# Patient Record
Sex: FEMALE | Race: WHITE | NOT HISPANIC OR LATINO
[De-identification: names, ages, dates, MRNs, and addresses within clinical notes are randomized per-mention and may not be internally consistent; named-entity substitution may affect disease eponyms.]

---

## 2017-01-07 ENCOUNTER — RX RENEWAL (OUTPATIENT)
Age: 57
End: 2017-01-07

## 2017-02-05 ENCOUNTER — RX RENEWAL (OUTPATIENT)
Age: 57
End: 2017-02-05

## 2017-03-08 ENCOUNTER — NON-APPOINTMENT (OUTPATIENT)
Age: 57
End: 2017-03-08

## 2017-03-08 ENCOUNTER — APPOINTMENT (OUTPATIENT)
Dept: INTERNAL MEDICINE | Facility: CLINIC | Age: 57
End: 2017-03-08

## 2017-03-08 VITALS
DIASTOLIC BLOOD PRESSURE: 72 MMHG | TEMPERATURE: 96.7 F | BODY MASS INDEX: 27.46 KG/M2 | RESPIRATION RATE: 14 BRPM | HEART RATE: 74 BPM | SYSTOLIC BLOOD PRESSURE: 128 MMHG | WEIGHT: 160 LBS

## 2017-03-08 LAB
BILIRUB UR QL STRIP: NEGATIVE
GLUCOSE BLDC GLUCOMTR-MCNC: 293
GLUCOSE UR-MCNC: 1000
HGB UR QL STRIP.AUTO: NEGATIVE
KETONES UR-MCNC: NEGATIVE
LEUKOCYTE ESTERASE UR QL STRIP: NEGATIVE
NITRITE UR QL STRIP: NEGATIVE
PH UR STRIP: 7
PROT UR STRIP-MCNC: NORMAL
SP GR UR STRIP: 1.02

## 2017-03-08 RX ORDER — MECLIZINE HYDROCHLORIDE 12.5 MG/1
12.5 TABLET ORAL
Qty: 90 | Refills: 1 | Status: ACTIVE | COMMUNITY
Start: 2017-03-08 | End: 1900-01-01

## 2017-03-09 ENCOUNTER — RX RENEWAL (OUTPATIENT)
Age: 57
End: 2017-03-09

## 2017-03-09 LAB
25(OH)D3 SERPL-MCNC: 20.6 NG/ML
ALBUMIN SERPL ELPH-MCNC: 4.2 G/DL
ALP BLD-CCNC: 71 U/L
ALT SERPL-CCNC: 13 U/L
ANION GAP SERPL CALC-SCNC: 14 MMOL/L
AST SERPL-CCNC: 15 U/L
BASOPHILS # BLD AUTO: 0.05 K/UL
BASOPHILS NFR BLD AUTO: 0.8 %
BILIRUB SERPL-MCNC: 0.7 MG/DL
BUN SERPL-MCNC: 16 MG/DL
CALCIUM SERPL-MCNC: 9.8 MG/DL
CHLORIDE SERPL-SCNC: 96 MMOL/L
CHOLEST SERPL-MCNC: 237 MG/DL
CHOLEST/HDLC SERPL: 4.9 RATIO
CO2 SERPL-SCNC: 24 MMOL/L
CREAT SERPL-MCNC: 0.76 MG/DL
CREAT SPEC-SCNC: 68 MG/DL
EOSINOPHIL # BLD AUTO: 0.07 K/UL
EOSINOPHIL NFR BLD AUTO: 1.1 %
GLUCOSE SERPL-MCNC: 332 MG/DL
HBA1C MFR BLD HPLC: 12.8 %
HCT VFR BLD CALC: 43.2 %
HDLC SERPL-MCNC: 48 MG/DL
HGB BLD-MCNC: 14 G/DL
IMM GRANULOCYTES NFR BLD AUTO: 0.3 %
LDLC SERPL CALC-MCNC: 163 MG/DL
LYMPHOCYTES # BLD AUTO: 2.41 K/UL
LYMPHOCYTES NFR BLD AUTO: 38.3 %
MAN DIFF?: NORMAL
MCHC RBC-ENTMCNC: 27.2 PG
MCHC RBC-ENTMCNC: 32.4 GM/DL
MCV RBC AUTO: 83.9 FL
MICROALBUMIN 24H UR DL<=1MG/L-MCNC: 4.6 MG/DL
MICROALBUMIN/CREAT 24H UR-RTO: 68 UG/MG
MONOCYTES # BLD AUTO: 0.4 K/UL
MONOCYTES NFR BLD AUTO: 6.3 %
NEUTROPHILS # BLD AUTO: 3.35 K/UL
NEUTROPHILS NFR BLD AUTO: 53.2 %
PLATELET # BLD AUTO: 381 K/UL
POTASSIUM SERPL-SCNC: 4.5 MMOL/L
PROT SERPL-MCNC: 6.9 G/DL
RBC # BLD: 5.15 M/UL
RBC # FLD: 13.7 %
SODIUM SERPL-SCNC: 134 MMOL/L
TRIGL SERPL-MCNC: 131 MG/DL
WBC # FLD AUTO: 6.3 K/UL

## 2017-05-08 ENCOUNTER — APPOINTMENT (OUTPATIENT)
Dept: INTERNAL MEDICINE | Facility: CLINIC | Age: 57
End: 2017-05-08

## 2017-06-23 ENCOUNTER — APPOINTMENT (OUTPATIENT)
Dept: INTERNAL MEDICINE | Facility: CLINIC | Age: 57
End: 2017-06-23

## 2017-06-23 VITALS
SYSTOLIC BLOOD PRESSURE: 132 MMHG | RESPIRATION RATE: 16 BRPM | HEART RATE: 76 BPM | DIASTOLIC BLOOD PRESSURE: 90 MMHG | TEMPERATURE: 97.6 F

## 2017-06-23 VITALS — BODY MASS INDEX: 26.61 KG/M2 | WEIGHT: 155 LBS

## 2017-06-23 LAB
BILIRUB UR QL STRIP: NEGATIVE
GLUCOSE BLDC GLUCOMTR-MCNC: 169
GLUCOSE UR-MCNC: NEGATIVE
HCG UR QL: 0.2 EU/DL
HGB UR QL STRIP.AUTO: NEGATIVE
KETONES UR-MCNC: NEGATIVE
LEUKOCYTE ESTERASE UR QL STRIP: NEGATIVE
NITRITE UR QL STRIP: NEGATIVE
PH UR STRIP: 6.5
PROT UR STRIP-MCNC: NEGATIVE
SP GR UR STRIP: 1.02

## 2017-06-24 LAB
25(OH)D3 SERPL-MCNC: 26.5 NG/ML
ALBUMIN SERPL ELPH-MCNC: 4.3 G/DL
ALP BLD-CCNC: 58 U/L
ALT SERPL-CCNC: 10 U/L
ANION GAP SERPL CALC-SCNC: 20 MMOL/L
AST SERPL-CCNC: 16 U/L
BASOPHILS # BLD AUTO: 0.04 K/UL
BASOPHILS NFR BLD AUTO: 0.5 %
BILIRUB SERPL-MCNC: 0.6 MG/DL
BUN SERPL-MCNC: 12 MG/DL
CALCIUM SERPL-MCNC: 9.3 MG/DL
CHLORIDE SERPL-SCNC: 100 MMOL/L
CHOLEST SERPL-MCNC: 181 MG/DL
CHOLEST/HDLC SERPL: 3.4 RATIO
CO2 SERPL-SCNC: 25 MMOL/L
CREAT SERPL-MCNC: 0.67 MG/DL
CREAT SPEC-SCNC: 74 MG/DL
EOSINOPHIL # BLD AUTO: 0.17 K/UL
EOSINOPHIL NFR BLD AUTO: 2.2 %
GLUCOSE SERPL-MCNC: 181 MG/DL
HBA1C MFR BLD HPLC: 8.9 %
HCT VFR BLD CALC: 42 %
HDLC SERPL-MCNC: 53 MG/DL
HGB BLD-MCNC: 13.2 G/DL
IMM GRANULOCYTES NFR BLD AUTO: 0.4 %
LDLC SERPL CALC-MCNC: 107 MG/DL
LYMPHOCYTES # BLD AUTO: 2.57 K/UL
LYMPHOCYTES NFR BLD AUTO: 33.7 %
MAN DIFF?: NORMAL
MCHC RBC-ENTMCNC: 26.5 PG
MCHC RBC-ENTMCNC: 31.4 GM/DL
MCV RBC AUTO: 84.3 FL
MICROALBUMIN 24H UR DL<=1MG/L-MCNC: 3.3 MG/DL
MICROALBUMIN/CREAT 24H UR-RTO: 45 MG/G
MONOCYTES # BLD AUTO: 0.39 K/UL
MONOCYTES NFR BLD AUTO: 5.1 %
NEUTROPHILS # BLD AUTO: 4.43 K/UL
NEUTROPHILS NFR BLD AUTO: 58.1 %
PLATELET # BLD AUTO: 356 K/UL
POTASSIUM SERPL-SCNC: 4.1 MMOL/L
PROT SERPL-MCNC: 7.3 G/DL
RBC # BLD: 4.98 M/UL
RBC # FLD: 14.2 %
SODIUM SERPL-SCNC: 145 MMOL/L
TRIGL SERPL-MCNC: 105 MG/DL
WBC # FLD AUTO: 7.63 K/UL

## 2017-06-26 ENCOUNTER — APPOINTMENT (OUTPATIENT)
Dept: INTERNAL MEDICINE | Facility: CLINIC | Age: 57
End: 2017-06-26

## 2017-09-22 ENCOUNTER — APPOINTMENT (OUTPATIENT)
Dept: INTERNAL MEDICINE | Facility: CLINIC | Age: 57
End: 2017-09-22

## 2017-09-29 ENCOUNTER — RX RENEWAL (OUTPATIENT)
Age: 57
End: 2017-09-29

## 2017-09-29 DIAGNOSIS — L23.9 ALLERGIC CONTACT DERMATITIS, UNSPECIFIED CAUSE: ICD-10-CM

## 2017-12-07 ENCOUNTER — APPOINTMENT (OUTPATIENT)
Dept: INTERNAL MEDICINE | Facility: CLINIC | Age: 57
End: 2017-12-07
Payer: COMMERCIAL

## 2017-12-07 VITALS
HEART RATE: 72 BPM | DIASTOLIC BLOOD PRESSURE: 92 MMHG | RESPIRATION RATE: 16 BRPM | TEMPERATURE: 98.5 F | SYSTOLIC BLOOD PRESSURE: 180 MMHG

## 2017-12-07 VITALS — WEIGHT: 152 LBS | BODY MASS INDEX: 26.09 KG/M2

## 2017-12-07 PROCEDURE — 82272 OCCULT BLD FECES 1-3 TESTS: CPT

## 2017-12-07 PROCEDURE — 99215 OFFICE O/P EST HI 40 MIN: CPT | Mod: 25

## 2017-12-07 PROCEDURE — 36415 COLL VENOUS BLD VENIPUNCTURE: CPT

## 2017-12-09 LAB
25(OH)D3 SERPL-MCNC: 23.6 NG/ML
ALBUMIN SERPL ELPH-MCNC: 4.4 G/DL
ALP BLD-CCNC: 62 U/L
ALT SERPL-CCNC: 10 U/L
ANION GAP SERPL CALC-SCNC: 17 MMOL/L
AST SERPL-CCNC: 17 U/L
BASOPHILS # BLD AUTO: 0.04 K/UL
BASOPHILS NFR BLD AUTO: 0.5 %
BILIRUB SERPL-MCNC: 0.9 MG/DL
BUN SERPL-MCNC: 14 MG/DL
CALCIUM SERPL-MCNC: 10.1 MG/DL
CHLORIDE SERPL-SCNC: 97 MMOL/L
CHOLEST SERPL-MCNC: 193 MG/DL
CHOLEST/HDLC SERPL: 3.9 RATIO
CO2 SERPL-SCNC: 26 MMOL/L
CREAT SERPL-MCNC: 0.74 MG/DL
EOSINOPHIL # BLD AUTO: 0.08 K/UL
EOSINOPHIL NFR BLD AUTO: 1.1 %
GLUCOSE SERPL-MCNC: 191 MG/DL
HBA1C MFR BLD HPLC: 8.5 %
HCT VFR BLD CALC: 40 %
HDLC SERPL-MCNC: 50 MG/DL
HGB BLD-MCNC: 13 G/DL
IMM GRANULOCYTES NFR BLD AUTO: 0.1 %
LDLC SERPL CALC-MCNC: 113 MG/DL
LYMPHOCYTES # BLD AUTO: 2.13 K/UL
LYMPHOCYTES NFR BLD AUTO: 28.6 %
MAN DIFF?: NORMAL
MCHC RBC-ENTMCNC: 26.7 PG
MCHC RBC-ENTMCNC: 32.5 GM/DL
MCV RBC AUTO: 82.1 FL
MONOCYTES # BLD AUTO: 0.49 K/UL
MONOCYTES NFR BLD AUTO: 6.6 %
NEUTROPHILS # BLD AUTO: 4.69 K/UL
NEUTROPHILS NFR BLD AUTO: 63.1 %
PLATELET # BLD AUTO: 363 K/UL
POTASSIUM SERPL-SCNC: 4.4 MMOL/L
PROT SERPL-MCNC: 7.2 G/DL
RBC # BLD: 4.87 M/UL
RBC # FLD: 14.8 %
SODIUM SERPL-SCNC: 140 MMOL/L
TRIGL SERPL-MCNC: 151 MG/DL
WBC # FLD AUTO: 7.44 K/UL

## 2017-12-21 ENCOUNTER — APPOINTMENT (OUTPATIENT)
Dept: INTERNAL MEDICINE | Facility: CLINIC | Age: 57
End: 2017-12-21
Payer: COMMERCIAL

## 2017-12-21 VITALS
TEMPERATURE: 98.1 F | DIASTOLIC BLOOD PRESSURE: 74 MMHG | BODY MASS INDEX: 25.75 KG/M2 | HEART RATE: 70 BPM | RESPIRATION RATE: 14 BRPM | SYSTOLIC BLOOD PRESSURE: 143 MMHG | WEIGHT: 150 LBS

## 2017-12-21 PROCEDURE — 99213 OFFICE O/P EST LOW 20 MIN: CPT | Mod: 25

## 2017-12-21 PROCEDURE — 36415 COLL VENOUS BLD VENIPUNCTURE: CPT

## 2017-12-22 LAB
ALBUMIN SERPL ELPH-MCNC: 4.1 G/DL
ALP BLD-CCNC: 57 U/L
ALT SERPL-CCNC: 8 U/L
ANION GAP SERPL CALC-SCNC: 15 MMOL/L
AST SERPL-CCNC: 12 U/L
BILIRUB SERPL-MCNC: 0.4 MG/DL
BUN SERPL-MCNC: 16 MG/DL
CALCIUM SERPL-MCNC: 9.8 MG/DL
CHLORIDE SERPL-SCNC: 100 MMOL/L
CO2 SERPL-SCNC: 26 MMOL/L
CREAT SERPL-MCNC: 0.72 MG/DL
GLUCOSE SERPL-MCNC: 188 MG/DL
POTASSIUM SERPL-SCNC: 4.4 MMOL/L
PROT SERPL-MCNC: 6.8 G/DL
SODIUM SERPL-SCNC: 141 MMOL/L

## 2018-03-09 ENCOUNTER — RX RENEWAL (OUTPATIENT)
Age: 58
End: 2018-03-09

## 2018-03-21 ENCOUNTER — RX RENEWAL (OUTPATIENT)
Age: 58
End: 2018-03-21

## 2018-04-03 ENCOUNTER — NON-APPOINTMENT (OUTPATIENT)
Age: 58
End: 2018-04-03

## 2018-04-03 ENCOUNTER — APPOINTMENT (OUTPATIENT)
Dept: INTERNAL MEDICINE | Facility: CLINIC | Age: 58
End: 2018-04-03
Payer: COMMERCIAL

## 2018-04-03 VITALS
RESPIRATION RATE: 14 BRPM | HEART RATE: 74 BPM | TEMPERATURE: 96.8 F | DIASTOLIC BLOOD PRESSURE: 74 MMHG | SYSTOLIC BLOOD PRESSURE: 160 MMHG

## 2018-04-03 VITALS — BODY MASS INDEX: 26.43 KG/M2 | WEIGHT: 154 LBS

## 2018-04-03 DIAGNOSIS — Z87.39 PERSONAL HISTORY OF OTHER DISEASES OF THE MUSCULOSKELETAL SYSTEM AND CONNECTIVE TISSUE: ICD-10-CM

## 2018-04-03 DIAGNOSIS — L03.115 CELLULITIS OF RIGHT LOWER LIMB: ICD-10-CM

## 2018-04-03 DIAGNOSIS — Z87.09 PERSONAL HISTORY OF OTHER DISEASES OF THE RESPIRATORY SYSTEM: ICD-10-CM

## 2018-04-03 DIAGNOSIS — H81.10 BENIGN PAROXYSMAL VERTIGO, UNSPECIFIED EAR: ICD-10-CM

## 2018-04-03 DIAGNOSIS — M54.5 LOW BACK PAIN: ICD-10-CM

## 2018-04-03 DIAGNOSIS — R10.10 UPPER ABDOMINAL PAIN, UNSPECIFIED: ICD-10-CM

## 2018-04-03 PROCEDURE — 36415 COLL VENOUS BLD VENIPUNCTURE: CPT

## 2018-04-03 PROCEDURE — 99215 OFFICE O/P EST HI 40 MIN: CPT | Mod: 25

## 2018-04-05 LAB
25(OH)D3 SERPL-MCNC: 23.3 NG/ML
ALBUMIN SERPL ELPH-MCNC: 4.2 G/DL
ALP BLD-CCNC: 54 U/L
ALT SERPL-CCNC: 8 U/L
ANION GAP SERPL CALC-SCNC: 14 MMOL/L
AST SERPL-CCNC: 10 U/L
BASOPHILS # BLD AUTO: 0.05 K/UL
BASOPHILS NFR BLD AUTO: 0.7 %
BILIRUB SERPL-MCNC: 0.4 MG/DL
BILIRUB UR QL STRIP: NEGATIVE
BUN SERPL-MCNC: 18 MG/DL
CALCIUM SERPL-MCNC: 9.9 MG/DL
CHLORIDE SERPL-SCNC: 102 MMOL/L
CHOLEST SERPL-MCNC: 182 MG/DL
CHOLEST/HDLC SERPL: 3.5 RATIO
CO2 SERPL-SCNC: 25 MMOL/L
CREAT SERPL-MCNC: 0.91 MG/DL
CREAT SPEC-SCNC: 102 MG/DL
EOSINOPHIL # BLD AUTO: 0.16 K/UL
EOSINOPHIL NFR BLD AUTO: 2.1 %
GLUCOSE SERPL-MCNC: 128 MG/DL
GLUCOSE UR-MCNC: NEGATIVE
HBA1C MFR BLD HPLC: 7.9 %
HCG UR QL: 0.2 EU/DL
HCT VFR BLD CALC: 39 %
HDLC SERPL-MCNC: 52 MG/DL
HGB BLD-MCNC: 12.7 G/DL
HGB UR QL STRIP.AUTO: NEGATIVE
IMM GRANULOCYTES NFR BLD AUTO: 0.1 %
KETONES UR-MCNC: NEGATIVE
LDLC SERPL CALC-MCNC: 101 MG/DL
LEUKOCYTE ESTERASE UR QL STRIP: NEGATIVE
LYMPHOCYTES # BLD AUTO: 2.6 K/UL
LYMPHOCYTES NFR BLD AUTO: 34.4 %
MAN DIFF?: NORMAL
MCHC RBC-ENTMCNC: 27 PG
MCHC RBC-ENTMCNC: 32.6 GM/DL
MCV RBC AUTO: 82.8 FL
MICROALBUMIN 24H UR DL<=1MG/L-MCNC: 5.1 MG/DL
MICROALBUMIN/CREAT 24H UR-RTO: 50 MG/G
MONOCYTES # BLD AUTO: 0.54 K/UL
MONOCYTES NFR BLD AUTO: 7.1 %
NEUTROPHILS # BLD AUTO: 4.2 K/UL
NEUTROPHILS NFR BLD AUTO: 55.6 %
NITRITE UR QL STRIP: NEGATIVE
PH UR STRIP: 5.5
PLATELET # BLD AUTO: 328 K/UL
POTASSIUM SERPL-SCNC: 4.7 MMOL/L
PROT SERPL-MCNC: 6.8 G/DL
PROT UR STRIP-MCNC: NORMAL
RBC # BLD: 4.71 M/UL
RBC # FLD: 14.1 %
SODIUM SERPL-SCNC: 141 MMOL/L
SP GR UR STRIP: 1.02
TRIGL SERPL-MCNC: 145 MG/DL
WBC # FLD AUTO: 7.56 K/UL

## 2018-04-09 ENCOUNTER — RX RENEWAL (OUTPATIENT)
Age: 58
End: 2018-04-09

## 2018-07-03 ENCOUNTER — APPOINTMENT (OUTPATIENT)
Dept: INTERNAL MEDICINE | Facility: CLINIC | Age: 58
End: 2018-07-03
Payer: COMMERCIAL

## 2018-07-03 VITALS
HEART RATE: 72 BPM | TEMPERATURE: 96.3 F | SYSTOLIC BLOOD PRESSURE: 120 MMHG | DIASTOLIC BLOOD PRESSURE: 64 MMHG | RESPIRATION RATE: 16 BRPM

## 2018-07-03 VITALS — WEIGHT: 155 LBS | BODY MASS INDEX: 26.61 KG/M2

## 2018-07-03 DIAGNOSIS — M65.339 TRIGGER FINGER, UNSPECIFIED MIDDLE FINGER: ICD-10-CM

## 2018-07-03 LAB
BILIRUB UR QL STRIP: NEGATIVE
CLARITY UR: CLEAR
COLLECTION METHOD: NORMAL
GLUCOSE UR-MCNC: NEGATIVE
HCG UR QL: 0.2 EU/DL
HGB UR QL STRIP.AUTO: NEGATIVE
KETONES UR-MCNC: NORMAL
LEUKOCYTE ESTERASE UR QL STRIP: NEGATIVE
NITRITE UR QL STRIP: NEGATIVE
PH UR STRIP: 5.5
PROT UR STRIP-MCNC: NORMAL
SP GR UR STRIP: 1.03

## 2018-07-03 PROCEDURE — 36415 COLL VENOUS BLD VENIPUNCTURE: CPT

## 2018-07-03 PROCEDURE — 99215 OFFICE O/P EST HI 40 MIN: CPT | Mod: 25

## 2018-07-03 NOTE — PHYSICAL EXAM
[No Acute Distress] : no acute distress [Well Nourished] : well nourished [Well Developed] : well developed [Well-Appearing] : well-appearing [Normal Sclera/Conjunctiva] : normal sclera/conjunctiva [PERRL] : pupils equal round and reactive to light [EOMI] : extraocular movements intact [Normal Outer Ear/Nose] : the outer ears and nose were normal in appearance [Normal Oropharynx] : the oropharynx was normal [Normal TMs] : both tympanic membranes were normal [Normal Nasal Mucosa] : the nasal mucosa was normal [No JVD] : no jugular venous distention [Supple] : supple [No Lymphadenopathy] : no lymphadenopathy [Thyroid Normal, No Nodules] : the thyroid was normal and there were no nodules present [No Respiratory Distress] : no respiratory distress  [Clear to Auscultation] : lungs were clear to auscultation bilaterally [No Accessory Muscle Use] : no accessory muscle use [Normal Rate] : normal rate  [Regular Rhythm] : with a regular rhythm [Normal S1, S2] : normal S1 and S2 [No Murmur] : no murmur heard [No Carotid Bruits] : no carotid bruits [No Abdominal Bruit] : a ~M bruit was not heard ~T in the abdomen [No Varicosities] : no varicosities [Pedal Pulses Present] : the pedal pulses are present [No Edema] : there was no peripheral edema [No Extremity Clubbing/Cyanosis] : no extremity clubbing/cyanosis [No Palpable Aorta] : no palpable aorta [Normal Appearance] : normal in appearance [No Nipple Discharge] : no nipple discharge [No Axillary Lymphadenopathy] : no axillary lymphadenopathy [Soft] : abdomen soft [Non Tender] : non-tender [Non-distended] : non-distended [No Masses] : no abdominal mass palpated [No HSM] : no HSM [Normal Bowel Sounds] : normal bowel sounds [Normal Supraclavicular Nodes] : no supraclavicular lymphadenopathy [Normal Axillary Nodes] : no axillary lymphadenopathy [Normal Posterior Cervical Nodes] : no posterior cervical lymphadenopathy [Normal Femoral Nodes] : no femoral lymphadenopathy [Normal Anterior Cervical Nodes] : no anterior cervical lymphadenopathy [Normal Inguinal Nodes] : no inguinal lymphadenopathy [No CVA Tenderness] : no CVA  tenderness [No Spinal Tenderness] : no spinal tenderness [Kyphosis] : no kyphosis [Scoliosis] : no scoliosis [No Joint Swelling] : no joint swelling [Grossly Normal Strength/Tone] : grossly normal strength/tone [No Rash] : no rash [No Skin Lesions] : no skin lesions [Acne] : no acne [Normal Gait] : normal gait [Coordination Grossly Intact] : coordination grossly intact [No Focal Deficits] : no focal deficits [Deep Tendon Reflexes (DTR)] : deep tendon reflexes were 2+ and symmetric [Speech Grossly Normal] : speech grossly normal [Memory Grossly Normal] : memory grossly normal [Normal Affect] : the affect was normal [Alert and Oriented x3] : oriented to person, place, and time [Normal Mood] : the mood was normal [Normal Insight/Judgement] : insight and judgment were intact

## 2018-07-03 NOTE — ASSESSMENT
[FreeTextEntry1] : Diagnoses #1  hypertension,BP is normal now. Patient to follow strict low-salt diet and  continue with current medication\par #2 trigger finger of the right middle finger. Patient to have hand surgery consult\par #3 adult onset diabetes, same treatment and diet\par #4 hyperlipidemia.. Continue same treatment and low fat diet. Patient is not fasting today to check her lipid\par #5 rule out liver toxicity due to chronic medication use\par #6 hypovitaminosis D., being supplemented\par #7 diabetic retinopathy.Be on  strict diabetic diet. \par Plan. patient advised to return after  3  months

## 2018-07-03 NOTE — HISTORY OF PRESENT ILLNESS
[FreeTextEntry1] : Patient comes for followup of her chronic medical problems and because of low back pain for a few weeks after she fell and injured her low back [de-identified] :  Patient wants  to check  on her  chronic medical problem like  hypertension, uncontrolled diabetes, and low vitamin D. Because of this and the blood pressure was elevated. Patient followed  lower salt diet the last several days and her blood pressure now is normalized.She has not any  anterior chest pain, shortness of breath, paroxysmal nocturnal dyspnea or orthopnea.Only she is unable to open her right middle finger and she feels a click sound whenever she tries to open it

## 2018-07-05 ENCOUNTER — RX RENEWAL (OUTPATIENT)
Age: 58
End: 2018-07-05

## 2018-07-05 LAB
25(OH)D3 SERPL-MCNC: 31.9 NG/ML
ALBUMIN SERPL ELPH-MCNC: 4.4 G/DL
ALP BLD-CCNC: 54 U/L
ALT SERPL-CCNC: 6 U/L
ANION GAP SERPL CALC-SCNC: 21 MMOL/L
AST SERPL-CCNC: 16 U/L
BILIRUB SERPL-MCNC: 0.7 MG/DL
BUN SERPL-MCNC: 20 MG/DL
CALCIUM SERPL-MCNC: 10.2 MG/DL
CHLORIDE SERPL-SCNC: 98 MMOL/L
CO2 SERPL-SCNC: 23 MMOL/L
CREAT SERPL-MCNC: 1.01 MG/DL
CREAT SPEC-SCNC: 228 MG/DL
GLUCOSE SERPL-MCNC: 140 MG/DL
HBA1C MFR BLD HPLC: 7.7 %
MICROALBUMIN 24H UR DL<=1MG/L-MCNC: 7.6 MG/DL
MICROALBUMIN/CREAT 24H UR-RTO: 33 MG/G
POTASSIUM SERPL-SCNC: 4.6 MMOL/L
PROT SERPL-MCNC: 7.1 G/DL
SODIUM SERPL-SCNC: 142 MMOL/L

## 2018-09-17 ENCOUNTER — APPOINTMENT (OUTPATIENT)
Dept: INTERNAL MEDICINE | Facility: CLINIC | Age: 58
End: 2018-09-17
Payer: COMMERCIAL

## 2018-09-17 VITALS
TEMPERATURE: 99.5 F | RESPIRATION RATE: 16 BRPM | SYSTOLIC BLOOD PRESSURE: 140 MMHG | HEART RATE: 78 BPM | DIASTOLIC BLOOD PRESSURE: 86 MMHG

## 2018-09-17 VITALS — WEIGHT: 160 LBS | BODY MASS INDEX: 27.46 KG/M2

## 2018-09-17 PROCEDURE — 99213 OFFICE O/P EST LOW 20 MIN: CPT

## 2018-09-17 NOTE — ASSESSMENT
[FreeTextEntry1] : #1 pharyngitis ; streptococcal .be on z senia\par . Patient advised to return after 10 days if not better or earlier if worse

## 2018-09-17 NOTE — PHYSICAL EXAM
[Well Nourished] : well nourished [Well Developed] : well developed [EOMI] : extraocular movements intact [Normal TMs] : both tympanic membranes were normal [No JVD] : no jugular venous distention [Supple] : supple [No Lymphadenopathy] : no lymphadenopathy [No Respiratory Distress] : no respiratory distress  [Clear to Auscultation] : lungs were clear to auscultation bilaterally [Normal Percussion] : the chest was normal to percussion [Normal Rate] : normal rate  [Regular Rhythm] : with a regular rhythm [Normal S1, S2] : normal S1 and S2 [No Murmur] : no murmur heard [Soft] : abdomen soft [Non Tender] : non-tender [Coordination Grossly Intact] : coordination grossly intact [No Focal Deficits] : no focal deficits [Normal Affect] : the affect was normal [Alert and Oriented x3] : oriented to person, place, and time [de-identified] : red throat.whitish spots in throat

## 2018-09-17 NOTE — HISTORY OF PRESENT ILLNESS
[FreeTextEntry8] : Patient comes to the office because of sore throat for 3 days. Note any chest pain, shortness of breath, wheezing, paroxysmal nocturnal dyspnea ,orthopnea.

## 2018-09-20 LAB — BACTERIA THROAT CULT: NORMAL

## 2018-09-25 ENCOUNTER — RESULT REVIEW (OUTPATIENT)
Age: 58
End: 2018-09-25

## 2018-10-12 ENCOUNTER — APPOINTMENT (OUTPATIENT)
Dept: INTERNAL MEDICINE | Facility: CLINIC | Age: 58
End: 2018-10-12

## 2018-12-10 ENCOUNTER — RX RENEWAL (OUTPATIENT)
Age: 58
End: 2018-12-10

## 2018-12-31 ENCOUNTER — APPOINTMENT (OUTPATIENT)
Dept: INTERNAL MEDICINE | Facility: CLINIC | Age: 58
End: 2018-12-31
Payer: COMMERCIAL

## 2018-12-31 PROCEDURE — 90682 RIV4 VACC RECOMBINANT DNA IM: CPT

## 2018-12-31 PROCEDURE — G0008: CPT

## 2019-02-10 ENCOUNTER — RX CHANGE (OUTPATIENT)
Age: 59
End: 2019-02-10

## 2019-02-12 ENCOUNTER — APPOINTMENT (OUTPATIENT)
Dept: INTERNAL MEDICINE | Facility: CLINIC | Age: 59
End: 2019-02-12
Payer: COMMERCIAL

## 2019-02-12 VITALS
OXYGEN SATURATION: 98 % | WEIGHT: 165.56 LBS | SYSTOLIC BLOOD PRESSURE: 153 MMHG | HEART RATE: 99 BPM | HEIGHT: 64 IN | DIASTOLIC BLOOD PRESSURE: 94 MMHG | BODY MASS INDEX: 28.27 KG/M2 | TEMPERATURE: 98 F

## 2019-02-12 PROCEDURE — 99215 OFFICE O/P EST HI 40 MIN: CPT | Mod: 25

## 2019-02-12 PROCEDURE — 81003 URINALYSIS AUTO W/O SCOPE: CPT | Mod: QW

## 2019-02-12 PROCEDURE — 36415 COLL VENOUS BLD VENIPUNCTURE: CPT

## 2019-02-12 RX ORDER — AZITHROMYCIN 250 MG/1
250 TABLET, FILM COATED ORAL
Qty: 6 | Refills: 0 | Status: DISCONTINUED | COMMUNITY
Start: 2018-09-17 | End: 2019-02-12

## 2019-02-13 VITALS — RESPIRATION RATE: 14 BRPM

## 2019-02-13 NOTE — PHYSICAL EXAM
[No Acute Distress] : no acute distress [Well Nourished] : well nourished [Well Developed] : well developed [Well-Appearing] : well-appearing [Normal Sclera/Conjunctiva] : normal sclera/conjunctiva [PERRL] : pupils equal round and reactive to light [EOMI] : extraocular movements intact [Normal Outer Ear/Nose] : the outer ears and nose were normal in appearance [Normal Oropharynx] : the oropharynx was normal [Normal TMs] : both tympanic membranes were normal [Normal Nasal Mucosa] : the nasal mucosa was normal [No JVD] : no jugular venous distention [Supple] : supple [No Lymphadenopathy] : no lymphadenopathy [Thyroid Normal, No Nodules] : the thyroid was normal and there were no nodules present [No Respiratory Distress] : no respiratory distress  [Clear to Auscultation] : lungs were clear to auscultation bilaterally [No Accessory Muscle Use] : no accessory muscle use [Normal Rate] : normal rate  [Regular Rhythm] : with a regular rhythm [Normal S1, S2] : normal S1 and S2 [No Murmur] : no murmur heard [No Carotid Bruits] : no carotid bruits [No Abdominal Bruit] : a ~M bruit was not heard ~T in the abdomen [No Varicosities] : no varicosities [Pedal Pulses Present] : the pedal pulses are present [No Edema] : there was no peripheral edema [No Extremity Clubbing/Cyanosis] : no extremity clubbing/cyanosis [No Palpable Aorta] : no palpable aorta [Soft] : abdomen soft [Non Tender] : non-tender [Non-distended] : non-distended [No Masses] : no abdominal mass palpated [No HSM] : no HSM [Normal Bowel Sounds] : normal bowel sounds [Normal Supraclavicular Nodes] : no supraclavicular lymphadenopathy [Normal Axillary Nodes] : no axillary lymphadenopathy [Normal Posterior Cervical Nodes] : no posterior cervical lymphadenopathy [Normal Anterior Cervical Nodes] : no anterior cervical lymphadenopathy [Normal Inguinal Nodes] : no inguinal lymphadenopathy [No CVA Tenderness] : no CVA  tenderness [No Spinal Tenderness] : no spinal tenderness [No Joint Swelling] : no joint swelling [Grossly Normal Strength/Tone] : grossly normal strength/tone [No Rash] : no rash [No Skin Lesions] : no skin lesions [Normal Gait] : normal gait [Coordination Grossly Intact] : coordination grossly intact [No Focal Deficits] : no focal deficits [Cranial Nerves Oculomotor (III)] : the extraocular motions were intact [Cranial Nerves Trigeminal (V)] : sensation to the face and masseter strength were intact [Cranial Nerves Facial (VII)] : facial strength was intact bilaterally [Cranial Nerves Vestibulocochlear (VIII)] : hearing was intact [Cranial Nerves Glossopharyngeal (IX)] : there was normal movement of the soft palate and normal gag [Cranial Nerves Accessory (XI - Cranial And Spinal)] : shoulder shrug was intact bilaterally [Cranial Nerves Hypoglossal (XII)] : there was no tongue deviation with protrusion [Sensation Tactile Decrease] : light touch was intact [Normal Affect] : the affect was normal [Normal Insight/Judgement] : insight and judgment were intact

## 2019-02-13 NOTE — HISTORY OF PRESENT ILLNESS
[FreeTextEntry1] : Patient presents for elevated blood pressure and diabetes [de-identified] : Patient had elevated blood pressure over the weekend states she checked her blood pressure was 180/101 90/100 patient started getting a headache and went to the emergency room in the emergency room she was given alprazolam and blood pressure improved. patient was started on amlodipine 5 mg over the weekend however blood pressure remains elevated according to her monitor 150s 160s over 90s to low 100s. patient states she has recent changes in diet including eating more pizza and salty food patient denies any chest pain chest tightness shortness of breath. patient also has anxiety which complicates the hypertension. Patient follows with ophthalmology for diabetes.

## 2019-02-13 NOTE — ASSESSMENT
[FreeTextEntry1] : 1) Hypertension still elevated in the office today amlodipine increased to 10 mg patient advised to buy monitor and check blood pressure daily and return to office 7-10 days with blood pressure readings. Patient counseled on the importance of limiting salt intake and limiting pizza, hand out of the Dash diet given to patient.\par 2) Diabetes check hemoglobin A1c patient states that her diet can be improved advised to start exercise program and start exercise such as yoga to help with anxiety we'll check hemoglobin A1c outpatient our ophthalmologist also check urine microalbumin ratio. Patient may benefit from endocrinology and nutritionist in the future\par 3) Hyperlipidemia will check lipid panel advised to start exercise program and follow low-fat diet discussed adequate portions recommended portions with patient\par 4) Hypovitaminosis D. currently on no supplementation will check levels\par 5) anxiety: anxiety and salt intake currently contributing to patient's elevated blood pressure discussed exercise with patient starting exercise program and partaking in yoga. \par Patient to return in 10-14 days for blood pressure check discussed that ideally blood pressures less than 140/90 given JNC VIII criteria.

## 2019-02-14 ENCOUNTER — MESSAGE (OUTPATIENT)
Age: 59
End: 2019-02-14

## 2019-02-14 LAB
25(OH)D3 SERPL-MCNC: 28.6 NG/ML
ALBUMIN SERPL ELPH-MCNC: 4.5 G/DL
ALP BLD-CCNC: 57 U/L
ALT SERPL-CCNC: 7 U/L
ANION GAP SERPL CALC-SCNC: 15 MMOL/L
AST SERPL-CCNC: 15 U/L
BASOPHILS # BLD AUTO: 0.05 K/UL
BASOPHILS NFR BLD AUTO: 0.6 %
BILIRUB SERPL-MCNC: 0.6 MG/DL
BUN SERPL-MCNC: 22 MG/DL
CALCIUM SERPL-MCNC: 10.3 MG/DL
CHLORIDE SERPL-SCNC: 101 MMOL/L
CHOLEST SERPL-MCNC: 187 MG/DL
CHOLEST/HDLC SERPL: 3.8 RATIO
CO2 SERPL-SCNC: 26 MMOL/L
CREAT SERPL-MCNC: 0.88 MG/DL
EOSINOPHIL # BLD AUTO: 0.13 K/UL
EOSINOPHIL NFR BLD AUTO: 1.6 %
GLUCOSE SERPL-MCNC: 138 MG/DL
HBA1C MFR BLD HPLC: 7.2 %
HCT VFR BLD CALC: 42.4 %
HDLC SERPL-MCNC: 49 MG/DL
HGB BLD-MCNC: 13.2 G/DL
IMM GRANULOCYTES NFR BLD AUTO: 0.3 %
LDLC SERPL CALC-MCNC: 115 MG/DL
LYMPHOCYTES # BLD AUTO: 2.52 K/UL
LYMPHOCYTES NFR BLD AUTO: 31.9 %
MAN DIFF?: NORMAL
MCHC RBC-ENTMCNC: 25.7 PG
MCHC RBC-ENTMCNC: 31.1 GM/DL
MCV RBC AUTO: 82.5 FL
MONOCYTES # BLD AUTO: 0.35 K/UL
MONOCYTES NFR BLD AUTO: 4.4 %
NEUTROPHILS # BLD AUTO: 4.83 K/UL
NEUTROPHILS NFR BLD AUTO: 61.2 %
PLATELET # BLD AUTO: 420 K/UL
POTASSIUM SERPL-SCNC: 4.3 MMOL/L
PROT SERPL-MCNC: 7.7 G/DL
RBC # BLD: 5.14 M/UL
RBC # FLD: 15.2 %
SODIUM SERPL-SCNC: 142 MMOL/L
TRIGL SERPL-MCNC: 114 MG/DL
TSH SERPL-ACNC: 1.8 UIU/ML
WBC # FLD AUTO: 7.9 K/UL

## 2019-03-12 ENCOUNTER — RX RENEWAL (OUTPATIENT)
Age: 59
End: 2019-03-12

## 2019-03-25 ENCOUNTER — RX RENEWAL (OUTPATIENT)
Age: 59
End: 2019-03-25

## 2019-03-27 ENCOUNTER — RX RENEWAL (OUTPATIENT)
Age: 59
End: 2019-03-27

## 2019-04-12 ENCOUNTER — RX RENEWAL (OUTPATIENT)
Age: 59
End: 2019-04-12

## 2019-07-12 ENCOUNTER — NON-APPOINTMENT (OUTPATIENT)
Age: 59
End: 2019-07-12

## 2019-07-12 ENCOUNTER — APPOINTMENT (OUTPATIENT)
Dept: INTERNAL MEDICINE | Facility: CLINIC | Age: 59
End: 2019-07-12
Payer: COMMERCIAL

## 2019-07-12 VITALS — RESPIRATION RATE: 16 BRPM

## 2019-07-12 VITALS
HEART RATE: 90 BPM | SYSTOLIC BLOOD PRESSURE: 140 MMHG | DIASTOLIC BLOOD PRESSURE: 85 MMHG | WEIGHT: 165.34 LBS | BODY MASS INDEX: 28.93 KG/M2 | TEMPERATURE: 98.4 F | OXYGEN SATURATION: 99 % | HEIGHT: 63.39 IN

## 2019-07-12 PROCEDURE — 99214 OFFICE O/P EST MOD 30 MIN: CPT | Mod: 25

## 2019-07-12 PROCEDURE — 99396 PREV VISIT EST AGE 40-64: CPT

## 2019-07-12 PROCEDURE — 93000 ELECTROCARDIOGRAM COMPLETE: CPT

## 2019-07-12 PROCEDURE — G0439: CPT

## 2019-07-12 PROCEDURE — G0444 DEPRESSION SCREEN ANNUAL: CPT

## 2019-07-12 PROCEDURE — 81003 URINALYSIS AUTO W/O SCOPE: CPT | Mod: QW

## 2019-07-12 PROCEDURE — 36415 COLL VENOUS BLD VENIPUNCTURE: CPT

## 2019-07-12 NOTE — HISTORY OF PRESENT ILLNESS
[FreeTextEntry1] : Patient comes to the office for her comprehensive annual  physical examination and followup of her  chronic medical problems. In addition patient feels tired last few days and with blurry  vision. Patient is under a lot of stress because of financial problems. Not in any anterior chest pain, shortness of, paroxysmal nocturnal dyspnea, orthopnea, abdominal pain or any bleeding episodes

## 2019-07-12 NOTE — ASSESSMENT
[FreeTextEntry1] : Diagnoses #1 tiredness most likely secondary to anxiety. Differential diagnosis includes anemia, hypothyroidism, myositis and hypovitaminosis.\par #2 hypertension. Patient missed her medications this morning. Patient to take her medications daily on time.\par #3 adult-onset diabetes. Diet and weight loss emphasized the patient\par #4 hyperlipidemia. Patient to continue with same medication and low-fat diet\par #5 monitor liver toxicity due to chronic medication use\par #6 hypovitaminosis D., being supplemented\par #7 gastritis GE reflux, not symptomatic.\par #8 blurry vision. Patient advised to see her ophthalmologist. It's possibly secondary to uncontrolled diabetes.\par #9 anxiety and depression. Patient refused to take any medication or  anything else to be done\par #2 obesity. Patient advised again to try to lose weight by cutting back on her calories and increasing her activity. Importance emphasized. 15 minutes spent\par Plan. Patient advised to return after 3 months

## 2019-07-12 NOTE — PHYSICAL EXAM
[No Acute Distress] : no acute distress [Well Nourished] : well nourished [Well Developed] : well developed [Well-Appearing] : well-appearing [Normal Sclera/Conjunctiva] : normal sclera/conjunctiva [PERRL] : pupils equal round and reactive to light [EOMI] : extraocular movements intact [Normal Outer Ear/Nose] : the outer ears and nose were normal in appearance [Normal Oropharynx] : the oropharynx was normal [Normal TMs] : both tympanic membranes were normal [Normal Nasal Mucosa] : the nasal mucosa was normal [No JVD] : no jugular venous distention [No Lymphadenopathy] : no lymphadenopathy [Supple] : supple [Thyroid Normal, No Nodules] : the thyroid was normal and there were no nodules present [No Respiratory Distress] : no respiratory distress  [No Accessory Muscle Use] : no accessory muscle use [Clear to Auscultation] : lungs were clear to auscultation bilaterally [Normal Percussion] : the chest was normal to percussion [Normal Rate] : normal rate  [Regular Rhythm] : with a regular rhythm [Normal S1, S2] : normal S1 and S2 [No Murmur] : no murmur heard [No Carotid Bruits] : no carotid bruits [No Abdominal Bruit] : a ~M bruit was not heard ~T in the abdomen [No Varicosities] : no varicosities [Pedal Pulses Present] : the pedal pulses are present [No Edema] : there was no peripheral edema [No Palpable Aorta] : no palpable aorta [No Extremity Clubbing/Cyanosis] : no extremity clubbing/cyanosis [Declined Breast Exam] : declined breast exam  [Normal Appearance] : normal in appearance [No Nipple Discharge] : no nipple discharge [No Axillary Lymphadenopathy] : no axillary lymphadenopathy [Soft] : abdomen soft [Non Tender] : non-tender [Non-distended] : non-distended [No Masses] : no abdominal mass palpated [Normal Bowel Sounds] : normal bowel sounds [No HSM] : no HSM [No Hernias] : no hernias [Declined Rectal Exam] : declined rectal exam [Normal Supraclavicular Nodes] : no supraclavicular lymphadenopathy [Normal Axillary Nodes] : no axillary lymphadenopathy [Normal Posterior Cervical Nodes] : no posterior cervical lymphadenopathy [Normal Anterior Cervical Nodes] : no anterior cervical lymphadenopathy [Normal Inguinal Nodes] : no inguinal lymphadenopathy [Normal Femoral Nodes] : no femoral lymphadenopathy [No CVA Tenderness] : no CVA  tenderness [No Spinal Tenderness] : no spinal tenderness [No Joint Swelling] : no joint swelling [Grossly Normal Strength/Tone] : grossly normal strength/tone [No Rash] : no rash [No Skin Lesions] : no skin lesions [Coordination Grossly Intact] : coordination grossly intact [No Focal Deficits] : no focal deficits [Normal Gait] : normal gait [Deep Tendon Reflexes (DTR)] : deep tendon reflexes were 2+ and symmetric [Speech Grossly Normal] : speech grossly normal [Memory Grossly Normal] : memory grossly normal [Normal Affect] : the affect was normal [Alert and Oriented x3] : oriented to person, place, and time [Normal Insight/Judgement] : insight and judgment were intact [Kyphosis] : no kyphosis [Scoliosis] : no scoliosis [Comprehensive Foot Exam Normal] : Right and left foot were examined and both feet are normal. No ulcers in either foot. Toes are normal and with full ROM.  Normal tactile sensation with monofilament testing throughout both feet

## 2019-07-12 NOTE — COUNSELING
[Weight management counseling provided] : Weight management [Healthy eating counseling provided] : healthy eating [Activity counseling provided] : activity [Fall prevention counseling provided] : fall prevention  [Low Fat Diet] : Low fat diet [Low Salt Diet] : Low salt diet [Walking] : Walking

## 2019-07-12 NOTE — HEALTH RISK ASSESSMENT
[Good] : ~his/her~  mood as  good [No] : No [No falls in past year] : Patient reported no falls in the past year [Patient reported mammogram was normal] : Patient reported mammogram was normal [Patient reported PAP Smear was normal] : Patient reported PAP Smear was normal [Hepatitis C test offered] : Hepatitis C test offered [Behavioral] : behavioral [With Family] : lives with family [Employed] : employed [High School] : high school [] :  [Feels Safe at Home] : Feels safe at home [Sexually Active] : sexually active [Fully functional (bathing, dressing, toileting, transferring, walking, feeding)] : Fully functional (bathing, dressing, toileting, transferring, walking, feeding) [Fully functional (using the telephone, shopping, preparing meals, housekeeping, doing laundry, using] : Fully functional and needs no help or supervision to perform IADLs (using the telephone, shopping, preparing meals, housekeeping, doing laundry, using transportation, managing medications and managing finances) [Reports changes in vision] : Reports changes in vision [Smoke Detector] : smoke detector [Carbon Monoxide Detector] : carbon monoxide detector [Safety elements used in home] : safety elements used in home [Seat Belt] :  uses seat belt [Sunscreen] : uses sunscreen [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,family,finances [] : No [de-identified] : no [de-identified] : Diabetic, low-salt and low-fat [Change in mental status noted] : No change in mental status noted [Reports changes in hearing] : Reports no changes in hearing [Reports changes in dental health] : Reports no changes in dental health [Guns at Home] : no guns at home [Travel to Developing Areas] : does not  travel to developing areas [Caregiver Concerns] : does not have caregiver concerns [TB Exposure] : is not being exposed to tuberculosis [MammogramComments] : by gyn,4 months ago [ColonoscopyComments] : refused [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

## 2019-07-15 ENCOUNTER — RX RENEWAL (OUTPATIENT)
Age: 59
End: 2019-07-15

## 2019-07-15 LAB
25(OH)D3 SERPL-MCNC: 24.4 NG/ML
ALBUMIN SERPL ELPH-MCNC: 4.3 G/DL
ALP BLD-CCNC: 62 U/L
ALT SERPL-CCNC: 8 U/L
ANION GAP SERPL CALC-SCNC: 19 MMOL/L
APPEARANCE: CLEAR
AST SERPL-CCNC: 12 U/L
BACTERIA THROAT CULT: NORMAL
BACTERIA: NEGATIVE
BASOPHILS # BLD AUTO: 0.07 K/UL
BASOPHILS NFR BLD AUTO: 1 %
BILIRUB SERPL-MCNC: 0.3 MG/DL
BILIRUBIN URINE: NEGATIVE
BLOOD URINE: NEGATIVE
BUN SERPL-MCNC: 16 MG/DL
CALCIUM SERPL-MCNC: 9.9 MG/DL
CHLORIDE SERPL-SCNC: 102 MMOL/L
CHOLEST SERPL-MCNC: 165 MG/DL
CHOLEST/HDLC SERPL: 3.4 RATIO
CO2 SERPL-SCNC: 19 MMOL/L
COLOR: NORMAL
CREAT SERPL-MCNC: 0.75 MG/DL
CREAT SPEC-SCNC: 89 MG/DL
EOSINOPHIL # BLD AUTO: 0.1 K/UL
EOSINOPHIL NFR BLD AUTO: 1.4 %
ESTIMATED AVERAGE GLUCOSE: 174 MG/DL
GLUCOSE QUALITATIVE U: NEGATIVE
GLUCOSE SERPL-MCNC: 162 MG/DL
HBA1C MFR BLD HPLC: 7.7 %
HCT VFR BLD CALC: 39.2 %
HCV AB SER QL: NONREACTIVE
HCV S/CO RATIO: 0.09 S/CO
HDLC SERPL-MCNC: 48 MG/DL
HGB BLD-MCNC: 11.8 G/DL
HYALINE CASTS: 0 /LPF
IMM GRANULOCYTES NFR BLD AUTO: 0.4 %
KETONES URINE: NEGATIVE
LDLC SERPL CALC-MCNC: 93 MG/DL
LEUKOCYTE ESTERASE URINE: NEGATIVE
LYMPHOCYTES # BLD AUTO: 2.32 K/UL
LYMPHOCYTES NFR BLD AUTO: 32.1 %
MAN DIFF?: NORMAL
MCHC RBC-ENTMCNC: 25.7 PG
MCHC RBC-ENTMCNC: 30.1 GM/DL
MCV RBC AUTO: 85.2 FL
MICROALBUMIN 24H UR DL<=1MG/L-MCNC: 4 MG/DL
MICROALBUMIN/CREAT 24H UR-RTO: 45 MG/G
MICROSCOPIC-UA: NORMAL
MONOCYTES # BLD AUTO: 0.41 K/UL
MONOCYTES NFR BLD AUTO: 5.7 %
NEUTROPHILS # BLD AUTO: 4.29 K/UL
NEUTROPHILS NFR BLD AUTO: 59.4 %
NITRITE URINE: NEGATIVE
PH URINE: 5.5
PLATELET # BLD AUTO: 377 K/UL
POTASSIUM SERPL-SCNC: 4.8 MMOL/L
PROT SERPL-MCNC: 6.8 G/DL
PROTEIN URINE: NEGATIVE
RBC # BLD: 4.6 M/UL
RBC # FLD: 14 %
RED BLOOD CELLS URINE: 2 /HPF
SODIUM SERPL-SCNC: 140 MMOL/L
SPECIFIC GRAVITY URINE: 1.02
SQUAMOUS EPITHELIAL CELLS: 2 /HPF
T4 FREE SERPL-MCNC: 1.4 NG/DL
TRIGL SERPL-MCNC: 122 MG/DL
TSH SERPL-ACNC: 2.73 UIU/ML
UROBILINOGEN URINE: NORMAL
VIT B12 SERPL-MCNC: 274 PG/ML
WBC # FLD AUTO: 7.22 K/UL
WHITE BLOOD CELLS URINE: 1 /HPF

## 2019-07-16 ENCOUNTER — RX RENEWAL (OUTPATIENT)
Age: 59
End: 2019-07-16

## 2019-07-16 LAB
CK BB SERPL ELPH-CCNC: 0 % (ref 0–?)
CK MB CFR SERPL ELPH: 0 %
CK MM SERPL ELPH-CCNC: 100 %
CREATINE KINASE,TOTAL,SERUM: 44 U/L
MACRO TYPE 1: 0 %
MACRO TYPE 2: 0 %

## 2019-08-02 ENCOUNTER — RX RENEWAL (OUTPATIENT)
Age: 59
End: 2019-08-02

## 2019-12-03 ENCOUNTER — RX RENEWAL (OUTPATIENT)
Age: 59
End: 2019-12-03

## 2019-12-03 ENCOUNTER — APPOINTMENT (OUTPATIENT)
Dept: INTERNAL MEDICINE | Facility: CLINIC | Age: 59
End: 2019-12-03
Payer: COMMERCIAL

## 2019-12-03 VITALS — RESPIRATION RATE: 14 BRPM | DIASTOLIC BLOOD PRESSURE: 64 MMHG | SYSTOLIC BLOOD PRESSURE: 132 MMHG

## 2019-12-03 VITALS
OXYGEN SATURATION: 99 % | DIASTOLIC BLOOD PRESSURE: 82 MMHG | TEMPERATURE: 98.4 F | SYSTOLIC BLOOD PRESSURE: 132 MMHG | HEART RATE: 78 BPM

## 2019-12-03 PROCEDURE — 99214 OFFICE O/P EST MOD 30 MIN: CPT

## 2019-12-03 RX ORDER — CLOBETASOL PROPIONATE 0.5 MG/G
0.05 AEROSOL, FOAM TOPICAL
Qty: 100 | Refills: 0 | Status: DISCONTINUED | COMMUNITY
Start: 2017-09-29 | End: 2019-12-03

## 2019-12-03 NOTE — HISTORY OF PRESENT ILLNESS
[FreeTextEntry8] : Patient comes to the office because of cough for approximately 4-5 days. Patient had also a lot of yellow phlegm but started getting better with Z-Adelso antibiotic. Skill  she has dry bothersome cough. No chest pain, shortness of breath, wheezing, paroxysmal nocturnal dyspnea, orthopnea or fever.Patient comes also  for followup of her chronic medical problems

## 2019-12-03 NOTE — ASSESSMENT
[FreeTextEntry1] : #1 acute bronchitis. Patient is improving on Z-Adelso. For the cough to be on benzonatate 100 mg p.o. every 8 hours p.r.n. Side effects of medications explained to patient who understood\par #2 hypertension, stable. Continue with same medication. Patient to come for fasting bloods the next few days.\par #3 AO diabetes. Diet and weight loss advised the patient\par #4 hyperlipidemia. Continue with same medication and low-fat diet.\par #5 hypovitaminosis D., supplemented.\par Plan. Patient to come  in next few days for fasting bloods and influenza vaccine

## 2019-12-03 NOTE — PHYSICAL EXAM
[No Acute Distress] : no acute distress [Well Nourished] : well nourished [Well-Appearing] : well-appearing [Well Developed] : well developed [Normal Voice/Communication] : normal voice/communication [Normal Sclera/Conjunctiva] : normal sclera/conjunctiva [PERRL] : pupils equal round and reactive to light [EOMI] : extraocular movements intact [Normal Outer Ear/Nose] : the outer ears and nose were normal in appearance [Normal Oropharynx] : the oropharynx was normal [No Lymphadenopathy] : no lymphadenopathy [Normal TMs] : both tympanic membranes were normal [No JVD] : no jugular venous distention [Thyroid Normal, No Nodules] : the thyroid was normal and there were no nodules present [Supple] : supple [No Accessory Muscle Use] : no accessory muscle use [No Respiratory Distress] : no respiratory distress  [Clear to Auscultation] : lungs were clear to auscultation bilaterally [Normal Rate] : normal rate  [Regular Rhythm] : with a regular rhythm [Normal S1, S2] : normal S1 and S2 [No Murmur] : no murmur heard [No Carotid Bruits] : no carotid bruits [No Abdominal Bruit] : a ~M bruit was not heard ~T in the abdomen [No Varicosities] : no varicosities [Pedal Pulses Present] : the pedal pulses are present [No Palpable Aorta] : no palpable aorta [No Edema] : there was no peripheral edema [No Extremity Clubbing/Cyanosis] : no extremity clubbing/cyanosis [No Axillary Lymphadenopathy] : no axillary lymphadenopathy [Soft] : abdomen soft [Non Tender] : non-tender [Non-distended] : non-distended [No Masses] : no abdominal mass palpated [No HSM] : no HSM [Normal Bowel Sounds] : normal bowel sounds [Normal Supraclavicular Nodes] : no supraclavicular lymphadenopathy [Normal Posterior Cervical Nodes] : no posterior cervical lymphadenopathy [Normal Anterior Cervical Nodes] : no anterior cervical lymphadenopathy [Normal Inguinal Nodes] : no inguinal lymphadenopathy [Normal Femoral Nodes] : no femoral lymphadenopathy [No CVA Tenderness] : no CVA  tenderness [No Spinal Tenderness] : no spinal tenderness [No Joint Swelling] : no joint swelling [Grossly Normal Strength/Tone] : grossly normal strength/tone [No Skin Lesions] : no skin lesions [No Rash] : no rash [Coordination Grossly Intact] : coordination grossly intact [No Focal Deficits] : no focal deficits [Normal Gait] : normal gait [Deep Tendon Reflexes (DTR)] : deep tendon reflexes were 2+ and symmetric [Speech Grossly Normal] : speech grossly normal [Normal Affect] : the affect was normal [Alert and Oriented x3] : oriented to person, place, and time [Normal Mood] : the mood was normal [Normal Insight/Judgement] : insight and judgment were intact

## 2019-12-16 ENCOUNTER — RX RENEWAL (OUTPATIENT)
Age: 59
End: 2019-12-16

## 2019-12-16 ENCOUNTER — RX CHANGE (OUTPATIENT)
Age: 59
End: 2019-12-16

## 2019-12-17 ENCOUNTER — RX RENEWAL (OUTPATIENT)
Age: 59
End: 2019-12-17

## 2020-02-10 ENCOUNTER — APPOINTMENT (OUTPATIENT)
Dept: INTERNAL MEDICINE | Facility: CLINIC | Age: 60
End: 2020-02-10
Payer: COMMERCIAL

## 2020-02-10 VITALS — DIASTOLIC BLOOD PRESSURE: 76 MMHG | RESPIRATION RATE: 16 BRPM | SYSTOLIC BLOOD PRESSURE: 134 MMHG

## 2020-02-10 VITALS
HEART RATE: 97 BPM | OXYGEN SATURATION: 98 % | TEMPERATURE: 98.7 F | DIASTOLIC BLOOD PRESSURE: 97 MMHG | SYSTOLIC BLOOD PRESSURE: 156 MMHG

## 2020-02-10 PROCEDURE — 99213 OFFICE O/P EST LOW 20 MIN: CPT

## 2020-02-10 NOTE — PHYSICAL EXAM
[No Acute Distress] : no acute distress [Well Nourished] : well nourished [Well Developed] : well developed [Well-Appearing] : well-appearing [Normal Voice/Communication] : normal voice/communication [Normal Sclera/Conjunctiva] : normal sclera/conjunctiva [PERRL] : pupils equal round and reactive to light [EOMI] : extraocular movements intact [Normal Outer Ear/Nose] : the outer ears and nose were normal in appearance [Normal Oropharynx] : the oropharynx was normal [No JVD] : no jugular venous distention [Normal TMs] : both tympanic membranes were normal [No Lymphadenopathy] : no lymphadenopathy [Supple] : supple [Thyroid Normal, No Nodules] : the thyroid was normal and there were no nodules present [No Respiratory Distress] : no respiratory distress  [Clear to Auscultation] : lungs were clear to auscultation bilaterally [No Accessory Muscle Use] : no accessory muscle use [Normal Percussion] : the chest was normal to percussion [Normal Rate] : normal rate  [Regular Rhythm] : with a regular rhythm [No Murmur] : no murmur heard [Normal S1, S2] : normal S1 and S2 [No Carotid Bruits] : no carotid bruits [No Abdominal Bruit] : a ~M bruit was not heard ~T in the abdomen [No Varicosities] : no varicosities [Pedal Pulses Present] : the pedal pulses are present [No Palpable Aorta] : no palpable aorta [No Edema] : there was no peripheral edema [Normal Appearance] : normal in appearance [No Extremity Clubbing/Cyanosis] : no extremity clubbing/cyanosis [Soft] : abdomen soft [No Axillary Lymphadenopathy] : no axillary lymphadenopathy [Non Tender] : non-tender [Non-distended] : non-distended [No Masses] : no abdominal mass palpated [No HSM] : no HSM [Normal Bowel Sounds] : normal bowel sounds [Normal Axillary Nodes] : no axillary lymphadenopathy [Normal Posterior Cervical Nodes] : no posterior cervical lymphadenopathy [Normal Anterior Cervical Nodes] : no anterior cervical lymphadenopathy [Normal Inguinal Nodes] : no inguinal lymphadenopathy [Normal Femoral Nodes] : no femoral lymphadenopathy [No Spinal Tenderness] : no spinal tenderness [No CVA Tenderness] : no CVA  tenderness [No Joint Swelling] : no joint swelling [Grossly Normal Strength/Tone] : grossly normal strength/tone [No Rash] : no rash [No Skin Lesions] : no skin lesions [Coordination Grossly Intact] : coordination grossly intact [No Focal Deficits] : no focal deficits [Normal Gait] : normal gait [Speech Grossly Normal] : speech grossly normal [Deep Tendon Reflexes (DTR)] : deep tendon reflexes were 2+ and symmetric [Alert and Oriented x3] : oriented to person, place, and time [Normal Affect] : the affect was normal [Normal Mood] : the mood was normal [Normal Insight/Judgement] : insight and judgment were intact [Comprehensive Foot Exam Normal] : Right and left foot were examined and both feet are normal. No ulcers in either foot. Toes are normal and with full ROM.  Normal tactile sensation with monofilament testing throughout both feet [de-identified] : Slightly congested nose

## 2020-02-10 NOTE — HISTORY OF PRESENT ILLNESS
[FreeTextEntry8] : Patient comes to the office because of a slight sore throat, congested nose with whitish excretions and dry cough for a week without any chest pain, shortness of breath, fever or wheezing

## 2020-02-10 NOTE — ASSESSMENT
[FreeTextEntry1] : Diagnoses #1 viral upper respiratory infection. Patient to drink warm  liquids and be on benzonatate 200 mg p.o. every 8 hours p.r.n. for cough. Since with dry cough  patient to be  on Symbicort 2 inhalations by mouth every 12 hours for a week and p.r.n. If with  shortness of breath, wheezing, fever or yellow sputum the patient to come back stat for possible antibiotic treatment. Side effects of medication explained to patient understood.\par Plan. the patient is to return after 10 days if not better or as needed if worse

## 2020-02-12 LAB — BACTERIA THROAT CULT: NORMAL

## 2020-02-26 ENCOUNTER — RESULT REVIEW (OUTPATIENT)
Age: 60
End: 2020-02-26

## 2020-03-14 ENCOUNTER — RX RENEWAL (OUTPATIENT)
Age: 60
End: 2020-03-14

## 2020-03-16 ENCOUNTER — RX RENEWAL (OUTPATIENT)
Age: 60
End: 2020-03-16

## 2020-05-26 ENCOUNTER — APPOINTMENT (OUTPATIENT)
Dept: INTERNAL MEDICINE | Facility: CLINIC | Age: 60
End: 2020-05-26
Payer: COMMERCIAL

## 2020-05-26 PROCEDURE — 36415 COLL VENOUS BLD VENIPUNCTURE: CPT

## 2020-05-27 LAB
SARS-COV-2 IGG SERPL IA-ACNC: <0.1 INDEX
SARS-COV-2 IGG SERPL QL IA: NEGATIVE

## 2020-06-04 ENCOUNTER — APPOINTMENT (OUTPATIENT)
Dept: INTERNAL MEDICINE | Facility: CLINIC | Age: 60
End: 2020-06-04
Payer: COMMERCIAL

## 2020-06-04 VITALS
SYSTOLIC BLOOD PRESSURE: 152 MMHG | RESPIRATION RATE: 16 BRPM | BODY MASS INDEX: 29.05 KG/M2 | DIASTOLIC BLOOD PRESSURE: 84 MMHG | WEIGHT: 166 LBS

## 2020-06-04 VITALS — HEART RATE: 101 BPM | SYSTOLIC BLOOD PRESSURE: 166 MMHG | OXYGEN SATURATION: 96 % | DIASTOLIC BLOOD PRESSURE: 101 MMHG

## 2020-06-04 DIAGNOSIS — Z87.09 PERSONAL HISTORY OF OTHER DISEASES OF THE RESPIRATORY SYSTEM: ICD-10-CM

## 2020-06-04 DIAGNOSIS — J06.9 ACUTE UPPER RESPIRATORY INFECTION, UNSPECIFIED: ICD-10-CM

## 2020-06-04 DIAGNOSIS — J02.0 STREPTOCOCCAL PHARYNGITIS: ICD-10-CM

## 2020-06-04 PROCEDURE — 99214 OFFICE O/P EST MOD 30 MIN: CPT | Mod: 25

## 2020-06-04 PROCEDURE — 81003 URINALYSIS AUTO W/O SCOPE: CPT | Mod: QW

## 2020-06-04 PROCEDURE — 36415 COLL VENOUS BLD VENIPUNCTURE: CPT

## 2020-06-04 PROCEDURE — G0444 DEPRESSION SCREEN ANNUAL: CPT | Mod: 59

## 2020-06-04 RX ORDER — BENZONATATE 100 MG/1
100 CAPSULE ORAL 3 TIMES DAILY
Qty: 30 | Refills: 1 | Status: DISCONTINUED | COMMUNITY
Start: 2019-12-03 | End: 2020-06-04

## 2020-06-04 NOTE — HISTORY OF PRESENT ILLNESS
[de-identified] : Patient comes for followup of her chronic medical problems and because recently she is depressed and anxious because of financial reasons. Patient is not suicidal. She has no chest pain, shortness of breath, paroxysmal nocturnal dyspnea or orthopnea

## 2020-06-04 NOTE — ASSESSMENT
[FreeTextEntry1] : Diagnoses #1  hypertension. Blood pressure is elevated today but she missed her blood pressure medication this morning and she is anxious.Patient to follow strict low-salt diet and  continue with current medication\par #2 Depression. Patient to start escitalopram 10 mg p.o. once a day and if not better after 3 weeks to make it 20 mg p.o. once a day. Side effects of medication explained to patient who understood\par #3 adult onset diabetes, same treatment and diet\par #4 hyperlipidemia.. Continue same treatment and low fat diet. \par #5 Monitor  liver toxicity due to chronic medication use\par #6 hypovitaminosis D., being supplemented\par #7 diabetic retinopathy.. She is followed up by the ophthalmologist\par Plan. patient advised to return after  3 weeks

## 2020-06-04 NOTE — PHYSICAL EXAM
[No Acute Distress] : no acute distress [Well Nourished] : well nourished [Well Developed] : well developed [Normal Voice/Communication] : normal voice/communication [Well-Appearing] : well-appearing [Normal Sclera/Conjunctiva] : normal sclera/conjunctiva [PERRL] : pupils equal round and reactive to light [EOMI] : extraocular movements intact [Normal Outer Ear/Nose] : the outer ears and nose were normal in appearance [Normal Oropharynx] : the oropharynx was normal [Normal TMs] : both tympanic membranes were normal [Normal Nasal Mucosa] : the nasal mucosa was normal [No JVD] : no jugular venous distention [Supple] : supple [No Lymphadenopathy] : no lymphadenopathy [Thyroid Normal, No Nodules] : the thyroid was normal and there were no nodules present [No Respiratory Distress] : no respiratory distress  [Clear to Auscultation] : lungs were clear to auscultation bilaterally [No Accessory Muscle Use] : no accessory muscle use [Normal Rate] : normal rate  [Regular Rhythm] : with a regular rhythm [Normal S1, S2] : normal S1 and S2 [No Murmur] : no murmur heard [No Carotid Bruits] : no carotid bruits [No Abdominal Bruit] : a ~M bruit was not heard ~T in the abdomen [No Varicosities] : no varicosities [Pedal Pulses Present] : the pedal pulses are present [No Edema] : there was no peripheral edema [No Extremity Clubbing/Cyanosis] : no extremity clubbing/cyanosis [No Palpable Aorta] : no palpable aorta [Normal Appearance] : normal in appearance [No Nipple Discharge] : no nipple discharge [No Axillary Lymphadenopathy] : no axillary lymphadenopathy [Soft] : abdomen soft [Non Tender] : non-tender [Non-distended] : non-distended [No Masses] : no abdominal mass palpated [No HSM] : no HSM [Normal Bowel Sounds] : normal bowel sounds [Normal Supraclavicular Nodes] : no supraclavicular lymphadenopathy [Normal Axillary Nodes] : no axillary lymphadenopathy [Normal Posterior Cervical Nodes] : no posterior cervical lymphadenopathy [Normal Femoral Nodes] : no femoral lymphadenopathy [Normal Anterior Cervical Nodes] : no anterior cervical lymphadenopathy [Normal Inguinal Nodes] : no inguinal lymphadenopathy [No CVA Tenderness] : no CVA  tenderness [No Spinal Tenderness] : no spinal tenderness [Kyphosis] : no kyphosis [Scoliosis] : no scoliosis [No Joint Swelling] : no joint swelling [Grossly Normal Strength/Tone] : grossly normal strength/tone [No Rash] : no rash [No Skin Lesions] : no skin lesions [Acne] : no acne [Normal Gait] : normal gait [Coordination Grossly Intact] : coordination grossly intact [No Focal Deficits] : no focal deficits [Deep Tendon Reflexes (DTR)] : deep tendon reflexes were 2+ and symmetric [Speech Grossly Normal] : speech grossly normal [Memory Grossly Normal] : memory grossly normal [Alert and Oriented x3] : oriented to person, place, and time [Normal Insight/Judgement] : insight and judgment were intact [de-identified] : crying in office [de-identified] : depressed ,anxious

## 2020-06-05 LAB
25(OH)D3 SERPL-MCNC: 24.5 NG/ML
ALBUMIN SERPL ELPH-MCNC: 4.3 G/DL
ALP BLD-CCNC: 65 U/L
ALT SERPL-CCNC: 8 U/L
ANION GAP SERPL CALC-SCNC: 13 MMOL/L
APPEARANCE: CLEAR
AST SERPL-CCNC: 10 U/L
BACTERIA: NEGATIVE
BASOPHILS # BLD AUTO: 0.1 K/UL
BASOPHILS NFR BLD AUTO: 1.3 %
BILIRUB SERPL-MCNC: 0.4 MG/DL
BILIRUBIN URINE: NEGATIVE
BLOOD URINE: NEGATIVE
BUN SERPL-MCNC: 14 MG/DL
CALCIUM SERPL-MCNC: 9.8 MG/DL
CHLORIDE SERPL-SCNC: 100 MMOL/L
CHOLEST SERPL-MCNC: 188 MG/DL
CHOLEST/HDLC SERPL: 4.2 RATIO
CO2 SERPL-SCNC: 25 MMOL/L
COLOR: NORMAL
CREAT SERPL-MCNC: 0.74 MG/DL
CREAT SPEC-SCNC: 147 MG/DL
EOSINOPHIL # BLD AUTO: 0.13 K/UL
EOSINOPHIL NFR BLD AUTO: 1.7 %
ESTIMATED AVERAGE GLUCOSE: 169 MG/DL
GLUCOSE QUALITATIVE U: NEGATIVE
GLUCOSE SERPL-MCNC: 178 MG/DL
HBA1C MFR BLD HPLC: 7.5 %
HCT VFR BLD CALC: 41.1 %
HDLC SERPL-MCNC: 45 MG/DL
HGB BLD-MCNC: 12.5 G/DL
HYALINE CASTS: 0 /LPF
IMM GRANULOCYTES NFR BLD AUTO: 0.4 %
KETONES URINE: NEGATIVE
LDLC SERPL CALC-MCNC: 121 MG/DL
LEUKOCYTE ESTERASE URINE: NEGATIVE
LYMPHOCYTES # BLD AUTO: 2.39 K/UL
LYMPHOCYTES NFR BLD AUTO: 31.3 %
MAN DIFF?: NORMAL
MCHC RBC-ENTMCNC: 26 PG
MCHC RBC-ENTMCNC: 30.4 GM/DL
MCV RBC AUTO: 85.4 FL
MICROALBUMIN 24H UR DL<=1MG/L-MCNC: 9.8 MG/DL
MICROALBUMIN/CREAT 24H UR-RTO: 67 MG/G
MICROSCOPIC-UA: NORMAL
MONOCYTES # BLD AUTO: 0.51 K/UL
MONOCYTES NFR BLD AUTO: 6.7 %
NEUTROPHILS # BLD AUTO: 4.47 K/UL
NEUTROPHILS NFR BLD AUTO: 58.6 %
NITRITE URINE: NEGATIVE
PH URINE: 5.5
PLATELET # BLD AUTO: 380 K/UL
POTASSIUM SERPL-SCNC: 4.8 MMOL/L
PROT SERPL-MCNC: 6.6 G/DL
PROTEIN URINE: ABNORMAL
RBC # BLD: 4.81 M/UL
RBC # FLD: 14.7 %
RED BLOOD CELLS URINE: 2 /HPF
SODIUM SERPL-SCNC: 138 MMOL/L
SPECIFIC GRAVITY URINE: 1.02
SQUAMOUS EPITHELIAL CELLS: 2 /HPF
TRIGL SERPL-MCNC: 109 MG/DL
UROBILINOGEN URINE: NORMAL
WBC # FLD AUTO: 7.63 K/UL
WHITE BLOOD CELLS URINE: 1 /HPF

## 2020-06-05 RX ORDER — BUDESONIDE AND FORMOTEROL FUMARATE DIHYDRATE 160; 4.5 UG/1; UG/1
160-4.5 AEROSOL RESPIRATORY (INHALATION)
Qty: 1 | Refills: 3 | Status: ACTIVE | COMMUNITY
Start: 2020-02-10 | End: 1900-01-01

## 2020-06-05 RX ORDER — AMLODIPINE BESYLATE 10 MG/1
10 TABLET ORAL DAILY
Qty: 90 | Refills: 3 | Status: DISCONTINUED | COMMUNITY
Start: 2019-02-10 | End: 2020-06-05

## 2020-06-08 LAB
T4 FREE SERPL-MCNC: 1.5 NG/DL
TSH SERPL-ACNC: 1.77 UIU/ML

## 2020-06-23 ENCOUNTER — APPOINTMENT (OUTPATIENT)
Dept: INTERNAL MEDICINE | Facility: CLINIC | Age: 60
End: 2020-06-23
Payer: COMMERCIAL

## 2020-06-23 VITALS
BODY MASS INDEX: 27.11 KG/M2 | TEMPERATURE: 98.2 F | HEIGHT: 63 IN | HEART RATE: 76 BPM | DIASTOLIC BLOOD PRESSURE: 68 MMHG | WEIGHT: 153 LBS | SYSTOLIC BLOOD PRESSURE: 126 MMHG | RESPIRATION RATE: 16 BRPM | OXYGEN SATURATION: 97 %

## 2020-06-23 PROCEDURE — 99212 OFFICE O/P EST SF 10 MIN: CPT

## 2020-06-23 RX ORDER — ESCITALOPRAM OXALATE 10 MG/1
10 TABLET ORAL
Qty: 90 | Refills: 3 | Status: DISCONTINUED | COMMUNITY
Start: 2020-06-04 | End: 2020-06-23

## 2020-09-01 ENCOUNTER — RX RENEWAL (OUTPATIENT)
Age: 60
End: 2020-09-01

## 2020-11-11 ENCOUNTER — RX RENEWAL (OUTPATIENT)
Age: 60
End: 2020-11-11

## 2020-11-30 ENCOUNTER — APPOINTMENT (OUTPATIENT)
Dept: INTERNAL MEDICINE | Facility: CLINIC | Age: 60
End: 2020-11-30

## 2020-11-30 ENCOUNTER — APPOINTMENT (OUTPATIENT)
Dept: INTERNAL MEDICINE | Facility: CLINIC | Age: 60
End: 2020-11-30
Payer: COMMERCIAL

## 2020-11-30 VITALS
RESPIRATION RATE: 14 BRPM | OXYGEN SATURATION: 97 % | DIASTOLIC BLOOD PRESSURE: 82 MMHG | HEART RATE: 74 BPM | SYSTOLIC BLOOD PRESSURE: 145 MMHG

## 2020-11-30 PROCEDURE — 99214 OFFICE O/P EST MOD 30 MIN: CPT

## 2020-11-30 NOTE — PHYSICAL EXAM
[No Acute Distress] : no acute distress [Well Nourished] : well nourished [Well Developed] : well developed [Well-Appearing] : well-appearing [Normal Sclera/Conjunctiva] : normal sclera/conjunctiva [PERRL] : pupils equal round and reactive to light [EOMI] : extraocular movements intact [Normal Outer Ear/Nose] : the outer ears and nose were normal in appearance [Normal Oropharynx] : the oropharynx was normal [No JVD] : no jugular venous distention [No Lymphadenopathy] : no lymphadenopathy [Supple] : supple [Thyroid Normal, No Nodules] : the thyroid was normal and there were no nodules present [No Respiratory Distress] : no respiratory distress  [No Accessory Muscle Use] : no accessory muscle use [Clear to Auscultation] : lungs were clear to auscultation bilaterally [Normal Rate] : normal rate  [Regular Rhythm] : with a regular rhythm [Normal S1, S2] : normal S1 and S2 [No Murmur] : no murmur heard [No Carotid Bruits] : no carotid bruits [No Abdominal Bruit] : a ~M bruit was not heard ~T in the abdomen [No Varicosities] : no varicosities [Pedal Pulses Present] : the pedal pulses are present [No Edema] : there was no peripheral edema [No Palpable Aorta] : no palpable aorta [No Extremity Clubbing/Cyanosis] : no extremity clubbing/cyanosis [Soft] : abdomen soft [Non Tender] : non-tender [Non-distended] : non-distended [No Masses] : no abdominal mass palpated [No HSM] : no HSM [Normal Bowel Sounds] : normal bowel sounds [Normal Supraclavicular Nodes] : no supraclavicular lymphadenopathy [Normal Axillary Nodes] : no axillary lymphadenopathy [Normal Posterior Cervical Nodes] : no posterior cervical lymphadenopathy [Normal Anterior Cervical Nodes] : no anterior cervical lymphadenopathy [Normal Inguinal Nodes] : no inguinal lymphadenopathy [Normal Femoral Nodes] : no femoral lymphadenopathy [No CVA Tenderness] : no CVA  tenderness [No Spinal Tenderness] : no spinal tenderness [No Joint Swelling] : no joint swelling [Grossly Normal Strength/Tone] : grossly normal strength/tone [No Rash] : no rash [No Skin Lesions] : no skin lesions [Coordination Grossly Intact] : coordination grossly intact [No Focal Deficits] : no focal deficits [Normal Gait] : normal gait [Normal Affect] : the affect was normal [Alert and Oriented x3] : oriented to person, place, and time [Normal Mood] : the mood was normal [Normal Insight/Judgement] : insight and judgment were intact

## 2020-11-30 NOTE — HISTORY OF PRESENT ILLNESS
[FreeTextEntry8] : Patient comes to the office because of anterior right  lower chest wall  pain and pain of the lateral  right lower  chest wall for a few days. The pain is intermittent, pulling-like and mostly  with some movements. She has no cough, fever, wheezing or hemoptysis. She was exposed to covid 19 almost a week ago.She has no weakness,fever,body pain or other covid related symptoms

## 2020-11-30 NOTE — ASSESSMENT
[FreeTextEntry1] : Diagnoses #1 right chest pain musculoskeletal by history and examination. Patient to take Tylenol 500 mg p.o. q.6 hours p.r.n. If worsening in intensity  with shortness of breath, cough fever, patient to come back stat or go to the ER\par #2 hypertension, blood pressure slightly elevated but she missed her medications this morning. Patient advised her  taking medications daily on time and follow a strict low-salt diet.\par #3 Covid  19 exposure. Patient advised to continue taken precautions and stay in come in for another 10 days. If with fever weakness,body  pain and SOB  to come back stat.\par Plan. Patient is to return if with  symptoms of covid 19 or persisting or worsening chest pain

## 2020-12-02 LAB — SARS-COV-2 N GENE NPH QL NAA+PROBE: DETECTED

## 2020-12-14 ENCOUNTER — APPOINTMENT (OUTPATIENT)
Dept: INTERNAL MEDICINE | Facility: CLINIC | Age: 60
End: 2020-12-14
Payer: COMMERCIAL

## 2020-12-14 PROCEDURE — 99072 ADDL SUPL MATRL&STAF TM PHE: CPT

## 2020-12-14 PROCEDURE — G0008: CPT

## 2020-12-14 PROCEDURE — 90662 IIV NO PRSV INCREASED AG IM: CPT

## 2020-12-17 LAB — SARS-COV-2 N GENE NPH QL NAA+PROBE: DETECTED

## 2020-12-23 ENCOUNTER — APPOINTMENT (OUTPATIENT)
Dept: INTERNAL MEDICINE | Facility: CLINIC | Age: 60
End: 2020-12-23
Payer: COMMERCIAL

## 2020-12-23 PROCEDURE — 99072 ADDL SUPL MATRL&STAF TM PHE: CPT

## 2020-12-23 PROCEDURE — 99211 OFF/OP EST MAY X REQ PHY/QHP: CPT

## 2020-12-28 LAB — SARS-COV-2 N GENE NPH QL NAA+PROBE: NOT DETECTED

## 2021-01-11 ENCOUNTER — RX RENEWAL (OUTPATIENT)
Age: 61
End: 2021-01-11

## 2021-02-16 ENCOUNTER — RX RENEWAL (OUTPATIENT)
Age: 61
End: 2021-02-16

## 2021-03-29 ENCOUNTER — RX RENEWAL (OUTPATIENT)
Age: 61
End: 2021-03-29

## 2021-04-02 ENCOUNTER — RESULT REVIEW (OUTPATIENT)
Age: 61
End: 2021-04-02

## 2021-06-06 ENCOUNTER — RX RENEWAL (OUTPATIENT)
Age: 61
End: 2021-06-06

## 2021-07-02 ENCOUNTER — RX RENEWAL (OUTPATIENT)
Age: 61
End: 2021-07-02

## 2021-07-19 ENCOUNTER — RX RENEWAL (OUTPATIENT)
Age: 61
End: 2021-07-19

## 2021-07-21 ENCOUNTER — RX RENEWAL (OUTPATIENT)
Age: 61
End: 2021-07-21

## 2021-09-17 ENCOUNTER — RX RENEWAL (OUTPATIENT)
Age: 61
End: 2021-09-17

## 2021-10-11 ENCOUNTER — RX RENEWAL (OUTPATIENT)
Age: 61
End: 2021-10-11

## 2021-12-30 LAB — SARS-COV-2 N GENE NPH QL NAA+PROBE: NOT DETECTED

## 2022-02-02 ENCOUNTER — RX RENEWAL (OUTPATIENT)
Age: 62
End: 2022-02-02

## 2022-03-27 ENCOUNTER — RX RENEWAL (OUTPATIENT)
Age: 62
End: 2022-03-27

## 2022-05-02 ENCOUNTER — RX RENEWAL (OUTPATIENT)
Age: 62
End: 2022-05-02

## 2022-06-06 ENCOUNTER — RX RENEWAL (OUTPATIENT)
Age: 62
End: 2022-06-06

## 2022-07-11 ENCOUNTER — RX RENEWAL (OUTPATIENT)
Age: 62
End: 2022-07-11

## 2022-07-28 RX ORDER — BENZONATATE 200 MG/1
200 CAPSULE ORAL 3 TIMES DAILY
Qty: 30 | Refills: 1 | Status: DISCONTINUED | COMMUNITY
Start: 2020-02-10 | End: 2022-07-28

## 2022-08-16 ENCOUNTER — RX RENEWAL (OUTPATIENT)
Age: 62
End: 2022-08-16

## 2022-08-17 ENCOUNTER — RX RENEWAL (OUTPATIENT)
Age: 62
End: 2022-08-17

## 2022-08-22 ENCOUNTER — RX RENEWAL (OUTPATIENT)
Age: 62
End: 2022-08-22

## 2022-08-29 ENCOUNTER — RX RENEWAL (OUTPATIENT)
Age: 62
End: 2022-08-29

## 2022-09-12 ENCOUNTER — NON-APPOINTMENT (OUTPATIENT)
Age: 62
End: 2022-09-12

## 2022-09-12 ENCOUNTER — APPOINTMENT (OUTPATIENT)
Dept: INTERNAL MEDICINE | Facility: CLINIC | Age: 62
End: 2022-09-12

## 2022-09-12 VITALS
TEMPERATURE: 97.3 F | OXYGEN SATURATION: 97 % | HEIGHT: 61.61 IN | HEART RATE: 75 BPM | WEIGHT: 170.73 LBS | SYSTOLIC BLOOD PRESSURE: 131 MMHG | BODY MASS INDEX: 31.82 KG/M2 | DIASTOLIC BLOOD PRESSURE: 82 MMHG

## 2022-09-12 VITALS — RESPIRATION RATE: 12 BRPM | WEIGHT: 170 LBS | BODY MASS INDEX: 31.49 KG/M2

## 2022-09-12 DIAGNOSIS — J45.909 UNSPECIFIED ASTHMA, UNCOMPLICATED: ICD-10-CM

## 2022-09-12 DIAGNOSIS — R07.89 OTHER CHEST PAIN: ICD-10-CM

## 2022-09-12 DIAGNOSIS — F32.9 MAJOR DEPRESSIVE DISORDER, SINGLE EPISODE, UNSPECIFIED: ICD-10-CM

## 2022-09-12 DIAGNOSIS — R53.1 WEAKNESS: ICD-10-CM

## 2022-09-12 PROCEDURE — G0444 DEPRESSION SCREEN ANNUAL: CPT | Mod: 59

## 2022-09-12 PROCEDURE — G0447 BEHAVIOR COUNSEL OBESITY 15M: CPT

## 2022-09-12 PROCEDURE — 90682 RIV4 VACC RECOMBINANT DNA IM: CPT

## 2022-09-12 PROCEDURE — 93000 ELECTROCARDIOGRAM COMPLETE: CPT | Mod: 59

## 2022-09-12 PROCEDURE — 99396 PREV VISIT EST AGE 40-64: CPT | Mod: 25

## 2022-09-12 PROCEDURE — 99215 OFFICE O/P EST HI 40 MIN: CPT | Mod: 25

## 2022-09-12 PROCEDURE — G0008: CPT

## 2022-09-12 PROCEDURE — 36415 COLL VENOUS BLD VENIPUNCTURE: CPT

## 2022-09-12 RX ORDER — MELOXICAM 15 MG/1
15 TABLET ORAL
Qty: 30 | Refills: 2 | Status: DISCONTINUED | COMMUNITY
Start: 2017-06-23 | End: 2022-09-12

## 2022-09-12 NOTE — HISTORY OF PRESENT ILLNESS
[de-identified] : Patient comes for Medicare annual wellness visit and follow-up of Chronic medical problems.  In general she feels well without any chest pain, shortness of breath, paroxysmal nocturnal dyspnea or orthopnea or other symptoms

## 2022-09-12 NOTE — PHYSICAL EXAM
[No Acute Distress] : no acute distress [Well Nourished] : well nourished [Well Developed] : well developed [Well-Appearing] : well-appearing [Normal Sclera/Conjunctiva] : normal sclera/conjunctiva [PERRL] : pupils equal round and reactive to light [EOMI] : extraocular movements intact [Normal Outer Ear/Nose] : the outer ears and nose were normal in appearance [No JVD] : no jugular venous distention [No Lymphadenopathy] : no lymphadenopathy [Supple] : supple [Thyroid Normal, No Nodules] : the thyroid was normal and there were no nodules present [No Respiratory Distress] : no respiratory distress  [No Accessory Muscle Use] : no accessory muscle use [Clear to Auscultation] : lungs were clear to auscultation bilaterally [Normal Rate] : normal rate  [Regular Rhythm] : with a regular rhythm [Normal S1, S2] : normal S1 and S2 [No Murmur] : no murmur heard [No Carotid Bruits] : no carotid bruits [No Abdominal Bruit] : a ~M bruit was not heard ~T in the abdomen [No Varicosities] : no varicosities [Pedal Pulses Present] : the pedal pulses are present [No Edema] : there was no peripheral edema [No Palpable Aorta] : no palpable aorta [No Extremity Clubbing/Cyanosis] : no extremity clubbing/cyanosis [Soft] : abdomen soft [Non Tender] : non-tender [Non-distended] : non-distended [No HSM] : no HSM [Normal Bowel Sounds] : normal bowel sounds [Normal Posterior Cervical Nodes] : no posterior cervical lymphadenopathy [Normal Anterior Cervical Nodes] : no anterior cervical lymphadenopathy [No CVA Tenderness] : no CVA  tenderness [No Spinal Tenderness] : no spinal tenderness [No Joint Swelling] : no joint swelling [Grossly Normal Strength/Tone] : grossly normal strength/tone [No Rash] : no rash [Coordination Grossly Intact] : coordination grossly intact [No Focal Deficits] : no focal deficits [Normal Gait] : normal gait [Deep Tendon Reflexes (DTR)] : deep tendon reflexes were 2+ and symmetric [Normal Affect] : the affect was normal [Normal Insight/Judgement] : insight and judgment were intact [Normal Voice/Communication] : normal voice/communication [Normal TMs] : both tympanic membranes were normal [Normal Percussion] : the chest was normal to percussion [Normal Appearance] : normal in appearance [No Masses] : no palpable masses [No Nipple Discharge] : no nipple discharge [No Axillary Lymphadenopathy] : no axillary lymphadenopathy [Declined Rectal Exam] : declined rectal exam [Normal Supraclavicular Nodes] : no supraclavicular lymphadenopathy [Normal Axillary Nodes] : no axillary lymphadenopathy [Normal Inguinal Nodes] : no inguinal lymphadenopathy [Normal Femoral Nodes] : no femoral lymphadenopathy [No Skin Lesions] : no skin lesions [Speech Grossly Normal] : speech grossly normal [Alert and Oriented x3] : oriented to person, place, and time [Normal Mood] : the mood was normal [Comprehensive Foot Exam Normal] : Right and left foot were examined and both feet are normal. No ulcers in either foot. Toes are normal and with full ROM.  Normal tactile sensation with monofilament testing throughout both feet

## 2022-09-12 NOTE — ASSESSMENT
[FreeTextEntry1] : Diagnosis #1 hypertension, stable.  Same treatment.  CMP to lab\par 2.  Adult onset diabetes.  Diet and weight control advised the patient.  Importance emphasized to her.  A1c to lab.  Patient seen by the cardiologist approximately 2 years ago[DR Perez]\par 3.  Hyperlipidemia, same treatment and diet.  Lipids to lab\par 4.  Monitor liver toxicity due to chronic medication use.  CMP to lab\par 5.  History of gastritis and GE reflux, stable.  CBC to lab\par 6.  Hypovitaminosis D, being supplemented.  Level to lab\par 7.  Diabetic retinopathy.  Followed up by the specialist.  Importance to control diabetes emphasized to her\par 8.  History of hyperreactive airway, stable.\par 9.  Anxiety and depression, controlled well with current medication.  Continue the same\par 10.  Obesity.  Patient advised to try to lose weight by cutting back on her calories and increasing her activity.  Importance emphasized to her.  Approximately 15 minutes spent in\par Plan.  Advised to return after 3 months and importance emphasized.  Patient absolutely refused to have colonoscopy although importance emphasized to her.  Total approximately 45 minutes spent

## 2022-09-12 NOTE — HEALTH RISK ASSESSMENT
[Good] : ~his/her~ current health as good [No falls in past year] : Patient reported no falls in the past year [Patient reported mammogram was normal] : Patient reported mammogram was normal [Hepatitis C test offered] : Hepatitis C test offered [Behavioral] : behavioral [With Family] : lives with family [Employed] : employed [] :  [Fully functional (bathing, dressing, toileting, transferring, walking, feeding)] : Fully functional (bathing, dressing, toileting, transferring, walking, feeding) [Fully functional (using the telephone, shopping, preparing meals, housekeeping, doing laundry, using] : Fully functional and needs no help or supervision to perform IADLs (using the telephone, shopping, preparing meals, housekeeping, doing laundry, using transportation, managing medications and managing finances) [Smoke Detector] : smoke detector [Carbon Monoxide Detector] : carbon monoxide detector [Safety elements used in home] : safety elements used in home [Seat Belt] :  uses seat belt [Sunscreen] : uses sunscreen [Very Good] : ~his/her~  mood as very good [Never] : Never [No] : No [Sexually Active] : sexually active [Feels Safe at Home] : Feels safe at home [Reports changes in hearing] : Reports no changes in hearing [Reports changes in vision] : Reports no changes in vision [Reports changes in dental health] : Reports no changes in dental health [Guns at Home] : no guns at home [Travel to Developing Areas] : does not  travel to developing areas [TB Exposure] : is not being exposed to tuberculosis [Caregiver Concerns] : does not have caregiver concerns [ColonoscopyComments] : Refused although importance emphasized the her

## 2022-09-13 LAB
25(OH)D3 SERPL-MCNC: 33.8 NG/ML
ALBUMIN SERPL ELPH-MCNC: 3.9 G/DL
ALP BLD-CCNC: 71 U/L
ALT SERPL-CCNC: 10 U/L
ANION GAP SERPL CALC-SCNC: 9 MMOL/L
APPEARANCE: CLEAR
AST SERPL-CCNC: 12 U/L
BACTERIA: NEGATIVE
BASOPHILS # BLD AUTO: 0.12 K/UL
BASOPHILS NFR BLD AUTO: 1.4 %
BILIRUB SERPL-MCNC: 0.5 MG/DL
BILIRUBIN URINE: NEGATIVE
BLOOD URINE: NEGATIVE
BUN SERPL-MCNC: 18 MG/DL
CALCIUM SERPL-MCNC: 9.7 MG/DL
CHLORIDE SERPL-SCNC: 100 MMOL/L
CHOLEST SERPL-MCNC: 146 MG/DL
CO2 SERPL-SCNC: 27 MMOL/L
COLOR: NORMAL
CREAT SERPL-MCNC: 0.89 MG/DL
CREAT SPEC-SCNC: 61 MG/DL
EGFR: 74 ML/MIN/1.73M2
EOSINOPHIL # BLD AUTO: 0.16 K/UL
EOSINOPHIL NFR BLD AUTO: 1.9 %
ESTIMATED AVERAGE GLUCOSE: 189 MG/DL
GLUCOSE QUALITATIVE U: ABNORMAL
GLUCOSE SERPL-MCNC: 192 MG/DL
HBA1C MFR BLD HPLC: 8.2 %
HCT VFR BLD CALC: 41 %
HCV AB SER QL: NONREACTIVE
HCV S/CO RATIO: 0.06 S/CO
HDLC SERPL-MCNC: 46 MG/DL
HGB BLD-MCNC: 12.1 G/DL
HYALINE CASTS: 0 /LPF
IMM GRANULOCYTES NFR BLD AUTO: 0.5 %
KETONES URINE: NEGATIVE
LDLC SERPL CALC-MCNC: 79 MG/DL
LEUKOCYTE ESTERASE URINE: NEGATIVE
LYMPHOCYTES # BLD AUTO: 2.22 K/UL
LYMPHOCYTES NFR BLD AUTO: 26.1 %
MAN DIFF?: NORMAL
MCHC RBC-ENTMCNC: 25.3 PG
MCHC RBC-ENTMCNC: 29.5 GM/DL
MCV RBC AUTO: 85.8 FL
MICROALBUMIN 24H UR DL<=1MG/L-MCNC: 1.4 MG/DL
MICROALBUMIN/CREAT 24H UR-RTO: 22 MG/G
MICROSCOPIC-UA: NORMAL
MONOCYTES # BLD AUTO: 0.66 K/UL
MONOCYTES NFR BLD AUTO: 7.8 %
NEUTROPHILS # BLD AUTO: 5.3 K/UL
NEUTROPHILS NFR BLD AUTO: 62.3 %
NITRITE URINE: NEGATIVE
NONHDLC SERPL-MCNC: 101 MG/DL
PH URINE: 6
PLATELET # BLD AUTO: 337 K/UL
POTASSIUM SERPL-SCNC: 4.8 MMOL/L
PROT SERPL-MCNC: 6.4 G/DL
PROTEIN URINE: NEGATIVE
RBC # BLD: 4.78 M/UL
RBC # FLD: 15.3 %
RED BLOOD CELLS URINE: 1 /HPF
SODIUM SERPL-SCNC: 136 MMOL/L
SPECIFIC GRAVITY URINE: 1.03
SQUAMOUS EPITHELIAL CELLS: 0 /HPF
TRIGL SERPL-MCNC: 109 MG/DL
UROBILINOGEN URINE: NORMAL
VIT B12 SERPL-MCNC: 310 PG/ML
WBC # FLD AUTO: 8.5 K/UL
WHITE BLOOD CELLS URINE: 0 /HPF

## 2022-09-13 RX ORDER — EMPAGLIFLOZIN 25 MG/1
25 TABLET, FILM COATED ORAL
Qty: 90 | Refills: 3 | Status: ACTIVE | COMMUNITY
Start: 2021-04-15 | End: 1900-01-01

## 2022-10-10 ENCOUNTER — RX RENEWAL (OUTPATIENT)
Age: 62
End: 2022-10-10

## 2022-10-17 ENCOUNTER — RX RENEWAL (OUTPATIENT)
Age: 62
End: 2022-10-17

## 2022-11-07 ENCOUNTER — RX RENEWAL (OUTPATIENT)
Age: 62
End: 2022-11-07

## 2022-12-18 ENCOUNTER — RX RENEWAL (OUTPATIENT)
Age: 62
End: 2022-12-18

## 2023-01-09 ENCOUNTER — APPOINTMENT (OUTPATIENT)
Dept: INTERNAL MEDICINE | Facility: CLINIC | Age: 63
End: 2023-01-09

## 2023-01-09 NOTE — ASSESSMENT
[FreeTextEntry1] : Diagnosis #1 hypertension, stable.  Same treatment.  CMP to lab\par 2.  Adult onset diabetes.  Diet and weight control advised the patient.  Importance emphasized to her.  A1c to lab.  Patient seen by the cardiologist approximately 2 years ago[DR Perez]\par 3.  Hyperlipidemia, same treatment and diet.  Lipids to lab\par 4.  Monitor liver toxicity due to chronic medication use.  CMP to lab\par 5.  History of gastritis and GE reflux, stable.  CBC to lab\par 6.  Hypovitaminosis D, being supplemented.  Level to lab\par 7.  Diabetic retinopathy.  Followed up by the specialist.  Importance to control diabetes emphasized to her\par 8.  History of hyperreactive airway, stable.\par 9.  Anxiety and depression, controlled well with current medication.  Continue the same\par Plan.  Advised to return after 3 months and importance emphasized.  Patient absolutely refused to have colonoscopy although importance emphasized to her.

## 2023-01-09 NOTE — PHYSICAL EXAM
[No Acute Distress] : no acute distress [Well Nourished] : well nourished [Well Developed] : well developed [Well-Appearing] : well-appearing [Normal Voice/Communication] : normal voice/communication [Normal Sclera/Conjunctiva] : normal sclera/conjunctiva [PERRL] : pupils equal round and reactive to light [EOMI] : extraocular movements intact [Normal Outer Ear/Nose] : the outer ears and nose were normal in appearance [Normal TMs] : both tympanic membranes were normal [No JVD] : no jugular venous distention [No Lymphadenopathy] : no lymphadenopathy [Supple] : supple [Thyroid Normal, No Nodules] : the thyroid was normal and there were no nodules present [No Respiratory Distress] : no respiratory distress  [No Accessory Muscle Use] : no accessory muscle use [Clear to Auscultation] : lungs were clear to auscultation bilaterally [Normal Percussion] : the chest was normal to percussion [Normal Rate] : normal rate  [Regular Rhythm] : with a regular rhythm [Normal S1, S2] : normal S1 and S2 [No Murmur] : no murmur heard [No Carotid Bruits] : no carotid bruits [No Abdominal Bruit] : a ~M bruit was not heard ~T in the abdomen [No Varicosities] : no varicosities [Pedal Pulses Present] : the pedal pulses are present [No Edema] : there was no peripheral edema [No Palpable Aorta] : no palpable aorta [No Extremity Clubbing/Cyanosis] : no extremity clubbing/cyanosis [Normal Appearance] : normal in appearance [No Nipple Discharge] : no nipple discharge [No Axillary Lymphadenopathy] : no axillary lymphadenopathy [Soft] : abdomen soft [Non Tender] : non-tender [Non-distended] : non-distended [No Masses] : no abdominal mass palpated [No HSM] : no HSM [Normal Bowel Sounds] : normal bowel sounds [Declined Rectal Exam] : declined rectal exam [Normal Supraclavicular Nodes] : no supraclavicular lymphadenopathy [Normal Axillary Nodes] : no axillary lymphadenopathy [Normal Posterior Cervical Nodes] : no posterior cervical lymphadenopathy [Normal Anterior Cervical Nodes] : no anterior cervical lymphadenopathy [Normal Inguinal Nodes] : no inguinal lymphadenopathy [Normal Femoral Nodes] : no femoral lymphadenopathy [No CVA Tenderness] : no CVA  tenderness [No Spinal Tenderness] : no spinal tenderness [No Joint Swelling] : no joint swelling [Grossly Normal Strength/Tone] : grossly normal strength/tone [No Rash] : no rash [No Skin Lesions] : no skin lesions [Coordination Grossly Intact] : coordination grossly intact [No Focal Deficits] : no focal deficits [Normal Gait] : normal gait [Deep Tendon Reflexes (DTR)] : deep tendon reflexes were 2+ and symmetric [Speech Grossly Normal] : speech grossly normal [Normal Affect] : the affect was normal [Alert and Oriented x3] : oriented to person, place, and time [Normal Mood] : the mood was normal [Normal Insight/Judgement] : insight and judgment were intact [Comprehensive Foot Exam Normal] : Right and left foot were examined and both feet are normal. No ulcers in either foot. Toes are normal and with full ROM.  Normal tactile sensation with monofilament testing throughout both feet

## 2023-01-09 NOTE — HISTORY OF PRESENT ILLNESS
[de-identified] : Patient comes for Medicare annual wellness visit and follow-up of Chronic medical problems.  In general she feels well without any chest pain, shortness of breath, paroxysmal nocturnal dyspnea or orthopnea or other symptoms

## 2023-03-21 ENCOUNTER — APPOINTMENT (OUTPATIENT)
Dept: INTERNAL MEDICINE | Facility: CLINIC | Age: 63
End: 2023-03-21

## 2023-03-21 DIAGNOSIS — Z20.822 CONTACT WITH AND (SUSPECTED) EXPOSURE TO COVID-19: ICD-10-CM

## 2023-03-21 NOTE — PHYSICAL EXAM
[No Acute Distress] : no acute distress [Well Nourished] : well nourished [Well Developed] : well developed [Well-Appearing] : well-appearing [Normal Voice/Communication] : normal voice/communication [Normal Sclera/Conjunctiva] : normal sclera/conjunctiva [PERRL] : pupils equal round and reactive to light [EOMI] : extraocular movements intact [Normal Outer Ear/Nose] : the outer ears and nose were normal in appearance [Normal TMs] : both tympanic membranes were normal [No JVD] : no jugular venous distention [No Lymphadenopathy] : no lymphadenopathy [Supple] : supple [Thyroid Normal, No Nodules] : the thyroid was normal and there were no nodules present [No Respiratory Distress] : no respiratory distress  [No Accessory Muscle Use] : no accessory muscle use [Clear to Auscultation] : lungs were clear to auscultation bilaterally [Normal Percussion] : the chest was normal to percussion [Normal Rate] : normal rate  [Regular Rhythm] : with a regular rhythm [Normal S1, S2] : normal S1 and S2 [No Murmur] : no murmur heard [No Carotid Bruits] : no carotid bruits [No Abdominal Bruit] : a ~M bruit was not heard ~T in the abdomen [No Varicosities] : no varicosities [Pedal Pulses Present] : the pedal pulses are present [No Edema] : there was no peripheral edema [No Palpable Aorta] : no palpable aorta [No Extremity Clubbing/Cyanosis] : no extremity clubbing/cyanosis [Normal Appearance] : normal in appearance [No Nipple Discharge] : no nipple discharge [No Axillary Lymphadenopathy] : no axillary lymphadenopathy [Soft] : abdomen soft [Non Tender] : non-tender [Non-distended] : non-distended [No Masses] : no abdominal mass palpated [No HSM] : no HSM [Normal Bowel Sounds] : normal bowel sounds [Declined Rectal Exam] : declined rectal exam [Normal] : no posterior cervical lymphadenopathy and no anterior cervical lymphadenopathy [No CVA Tenderness] : no CVA  tenderness [No Spinal Tenderness] : no spinal tenderness [No Joint Swelling] : no joint swelling [Grossly Normal Strength/Tone] : grossly normal strength/tone [No Rash] : no rash [No Skin Lesions] : no skin lesions [Coordination Grossly Intact] : coordination grossly intact [No Focal Deficits] : no focal deficits [Normal Gait] : normal gait [Deep Tendon Reflexes (DTR)] : deep tendon reflexes were 2+ and symmetric [Speech Grossly Normal] : speech grossly normal [Normal Affect] : the affect was normal [Alert and Oriented x3] : oriented to person, place, and time [Normal Mood] : the mood was normal [Normal Insight/Judgement] : insight and judgment were intact [Comprehensive Foot Exam Normal] : Right and left foot were examined and both feet are normal. No ulcers in either foot. Toes are normal and with full ROM.  Normal tactile sensation with monofilament testing throughout both feet

## 2023-03-21 NOTE — HISTORY OF PRESENT ILLNESS
[de-identified] : Patient comes for follow-up of her Chronic medical problems.She feels well without any chest pain, shortness of breath, paroxysmal nocturnal dyspnea  orthopnea or other symptoms

## 2023-03-21 NOTE — ASSESSMENT
[FreeTextEntry1] : Diagnosis #1 hypertension, stable.  Same treatment.  CMP to lab\par 2.  Adult onset diabetes.  Diet and weight control advised the patient. A1c to lab. \par 3.  Hyperlipidemia, same treatment and diet.  Lipids to lab\par 4.  Monitor liver toxicity due to chronic medication use.  CMP to lab\par 5.  History of gastritis and GE reflux, stable.  CBC to lab\par 6.  Hypovitaminosis D, being supplemented.  Level to lab\par 7.  Diabetic retinopathy.  Followed up by the specialist.  Importance to control diabetes emphasized to her\par 8.  History of hyperreactive airway, stable.\par 9.  Anxiety and depression, controlled well with current medication.  Continue the same\par Plan.  Advised to return after 3 months and importance emphasized.  Patient absolutely refused to have colonoscopy

## 2023-04-15 ENCOUNTER — RX RENEWAL (OUTPATIENT)
Age: 63
End: 2023-04-15

## 2023-05-31 ENCOUNTER — APPOINTMENT (OUTPATIENT)
Dept: OBGYN | Facility: CLINIC | Age: 63
End: 2023-05-31

## 2023-07-07 ENCOUNTER — NON-APPOINTMENT (OUTPATIENT)
Age: 63
End: 2023-07-07

## 2023-07-24 ENCOUNTER — RX RENEWAL (OUTPATIENT)
Age: 63
End: 2023-07-24

## 2023-09-06 ENCOUNTER — RX RENEWAL (OUTPATIENT)
Age: 63
End: 2023-09-06

## 2023-09-06 RX ORDER — GLIMEPIRIDE 1 MG/1
1 TABLET ORAL
Qty: 30 | Refills: 11 | Status: ACTIVE | COMMUNITY
Start: 2018-07-05 | End: 1900-01-01

## 2023-09-13 ENCOUNTER — APPOINTMENT (OUTPATIENT)
Dept: INTERNAL MEDICINE | Facility: CLINIC | Age: 63
End: 2023-09-13

## 2023-11-06 ENCOUNTER — RX RENEWAL (OUTPATIENT)
Age: 63
End: 2023-11-06

## 2023-11-07 ENCOUNTER — RX RENEWAL (OUTPATIENT)
Age: 63
End: 2023-11-07

## 2023-11-28 ENCOUNTER — APPOINTMENT (OUTPATIENT)
Dept: INTERNAL MEDICINE | Facility: CLINIC | Age: 63
End: 2023-11-28
Payer: COMMERCIAL

## 2023-11-28 ENCOUNTER — NON-APPOINTMENT (OUTPATIENT)
Age: 63
End: 2023-11-28

## 2023-11-28 VITALS
HEART RATE: 76 BPM | WEIGHT: 170 LBS | BODY MASS INDEX: 31.49 KG/M2 | DIASTOLIC BLOOD PRESSURE: 77 MMHG | RESPIRATION RATE: 14 BRPM | SYSTOLIC BLOOD PRESSURE: 128 MMHG

## 2023-11-28 DIAGNOSIS — Z23 ENCOUNTER FOR IMMUNIZATION: ICD-10-CM

## 2023-11-28 DIAGNOSIS — E66.9 OBESITY, UNSPECIFIED: ICD-10-CM

## 2023-11-28 DIAGNOSIS — F41.9 ANXIETY DISORDER, UNSPECIFIED: ICD-10-CM

## 2023-11-28 DIAGNOSIS — Z00.00 ENCOUNTER FOR GENERAL ADULT MEDICAL EXAMINATION W/OUT ABNORMAL FINDINGS: ICD-10-CM

## 2023-11-28 PROCEDURE — G0447 BEHAVIOR COUNSEL OBESITY 15M: CPT | Mod: 59

## 2023-11-28 PROCEDURE — 99396 PREV VISIT EST AGE 40-64: CPT | Mod: 25

## 2023-11-28 PROCEDURE — 99215 OFFICE O/P EST HI 40 MIN: CPT | Mod: 25

## 2023-11-28 PROCEDURE — 93000 ELECTROCARDIOGRAM COMPLETE: CPT | Mod: 59

## 2023-11-28 PROCEDURE — 90682 RIV4 VACC RECOMBINANT DNA IM: CPT

## 2023-11-28 PROCEDURE — 36415 COLL VENOUS BLD VENIPUNCTURE: CPT

## 2023-11-28 PROCEDURE — G0008: CPT

## 2023-11-29 LAB
25(OH)D3 SERPL-MCNC: 28.9 NG/ML
ALBUMIN SERPL ELPH-MCNC: 4.3 G/DL
ALP BLD-CCNC: 67 U/L
ALT SERPL-CCNC: 8 U/L
ANION GAP SERPL CALC-SCNC: 12 MMOL/L
APPEARANCE: CLEAR
AST SERPL-CCNC: 11 U/L
BACTERIA: NEGATIVE /HPF
BASOPHILS # BLD AUTO: 0.07 K/UL
BASOPHILS NFR BLD AUTO: 1 %
BILIRUB SERPL-MCNC: 0.6 MG/DL
BILIRUBIN URINE: NEGATIVE
BLOOD URINE: NEGATIVE
BUN SERPL-MCNC: 22 MG/DL
CALCIUM SERPL-MCNC: 9.8 MG/DL
CAST: 1 /LPF
CHLORIDE SERPL-SCNC: 101 MMOL/L
CHOLEST SERPL-MCNC: 121 MG/DL
CO2 SERPL-SCNC: 25 MMOL/L
COLOR: YELLOW
CREAT SERPL-MCNC: 0.94 MG/DL
CREAT SPEC-SCNC: 55 MG/DL
EGFR: 69 ML/MIN/1.73M2
EOSINOPHIL # BLD AUTO: 0.12 K/UL
EOSINOPHIL NFR BLD AUTO: 1.7 %
EPITHELIAL CELLS: 2 /HPF
ESTIMATED AVERAGE GLUCOSE: 197 MG/DL
GLUCOSE QUALITATIVE U: >=1000 MG/DL
GLUCOSE SERPL-MCNC: 170 MG/DL
HBA1C MFR BLD HPLC: 8.5 %
HCT VFR BLD CALC: 41.8 %
HDLC SERPL-MCNC: 46 MG/DL
HGB BLD-MCNC: 13.1 G/DL
IMM GRANULOCYTES NFR BLD AUTO: 0.3 %
KETONES URINE: NEGATIVE MG/DL
LDLC SERPL CALC-MCNC: 54 MG/DL
LEUKOCYTE ESTERASE URINE: NEGATIVE
LYMPHOCYTES # BLD AUTO: 2.5 K/UL
LYMPHOCYTES NFR BLD AUTO: 34.4 %
MAN DIFF?: NORMAL
MCHC RBC-ENTMCNC: 25.5 PG
MCHC RBC-ENTMCNC: 31.3 GM/DL
MCV RBC AUTO: 81.5 FL
MICROALBUMIN 24H UR DL<=1MG/L-MCNC: 1.4 MG/DL
MICROALBUMIN/CREAT 24H UR-RTO: 26 MG/G
MICROSCOPIC-UA: NORMAL
MONOCYTES # BLD AUTO: 0.49 K/UL
MONOCYTES NFR BLD AUTO: 6.7 %
NEUTROPHILS # BLD AUTO: 4.07 K/UL
NEUTROPHILS NFR BLD AUTO: 55.9 %
NITRITE URINE: NEGATIVE
NONHDLC SERPL-MCNC: 75 MG/DL
PH URINE: 5.5
PLATELET # BLD AUTO: 320 K/UL
POTASSIUM SERPL-SCNC: 4.9 MMOL/L
PROT SERPL-MCNC: 6.6 G/DL
PROTEIN URINE: NEGATIVE MG/DL
RBC # BLD: 5.13 M/UL
RBC # FLD: 14.6 %
RED BLOOD CELLS URINE: 0 /HPF
SODIUM SERPL-SCNC: 138 MMOL/L
SPECIFIC GRAVITY URINE: >1.03
TRIGL SERPL-MCNC: 118 MG/DL
UROBILINOGEN URINE: 0.2 MG/DL
VIT B12 SERPL-MCNC: 354 PG/ML
WBC # FLD AUTO: 7.27 K/UL
WHITE BLOOD CELLS URINE: 3 /HPF

## 2024-01-13 ENCOUNTER — RX RENEWAL (OUTPATIENT)
Age: 64
End: 2024-01-13

## 2024-01-13 RX ORDER — ESCITALOPRAM OXALATE 20 MG/1
20 TABLET ORAL
Qty: 30 | Refills: 11 | Status: ACTIVE | COMMUNITY
Start: 2020-06-04 | End: 1900-01-01

## 2024-02-17 ENCOUNTER — RX RENEWAL (OUTPATIENT)
Age: 64
End: 2024-02-17

## 2024-02-29 ENCOUNTER — APPOINTMENT (OUTPATIENT)
Dept: INTERNAL MEDICINE | Facility: CLINIC | Age: 64
End: 2024-02-29
Payer: COMMERCIAL

## 2024-02-29 VITALS
OXYGEN SATURATION: 98 % | WEIGHT: 170 LBS | DIASTOLIC BLOOD PRESSURE: 74 MMHG | HEART RATE: 72 BPM | BODY MASS INDEX: 31.69 KG/M2 | TEMPERATURE: 97.5 F | SYSTOLIC BLOOD PRESSURE: 123 MMHG | HEIGHT: 61.61 IN

## 2024-02-29 PROCEDURE — 87880 STREP A ASSAY W/OPTIC: CPT | Mod: QW

## 2024-02-29 PROCEDURE — 99214 OFFICE O/P EST MOD 30 MIN: CPT | Mod: 25

## 2024-02-29 PROCEDURE — 99204 OFFICE O/P NEW MOD 45 MIN: CPT | Mod: 25

## 2024-02-29 RX ORDER — AZITHROMYCIN 250 MG/1
250 TABLET, FILM COATED ORAL
Qty: 6 | Refills: 0 | Status: ACTIVE | COMMUNITY
Start: 2024-02-29 | End: 1900-01-01

## 2024-02-29 NOTE — PLAN
[FreeTextEntry1] : #Cough - Patient has been tested for covid/influenza/RSV today, will call with results.   #Sore throat -Patient has been tested for covid/influenza/RSV today, will call with results. Patient has tested negative for group A strep and a throat culture has been sent out. It was thoroughly explained to the patient that antibiotics for a sore throat are not indicated as per several guidelines and that the risks outweigh the benefits of prescribing antibiotics for this particular case. Patient is aware and still requests antibiotic treatment. Azithromycin has been prescribed to take for 5 days.

## 2024-02-29 NOTE — HISTORY OF PRESENT ILLNESS
[FreeTextEntry8] : 63 F with airway hyperreactivity, eczema, HTN, HLD, obesity, T2DM is presenting with sore throat, congestion and cough since yesterday. She tested negative for covid-19 this AM using home kit. She denies having fevers, chills, neck pain or tonsillar exudates. She states her PCP always gives her azithromycin when she has a sore throat. She has tried drinking hot liquids, lozenges and tylenol.

## 2024-02-29 NOTE — REVIEW OF SYSTEMS
[Nasal Discharge] : nasal discharge [Cough] : cough [Sore Throat] : sore throat [Negative] : Integumentary

## 2024-02-29 NOTE — PHYSICAL EXAM
[Normal Sclera/Conjunctiva] : normal sclera/conjunctiva [No Lymphadenopathy] : no lymphadenopathy [No Respiratory Distress] : no respiratory distress  [Normal] : no rash

## 2024-03-03 ENCOUNTER — NON-APPOINTMENT (OUTPATIENT)
Age: 64
End: 2024-03-03

## 2024-03-05 LAB
BACTERIA THROAT CULT: NORMAL
INFLUENZA A RESULT: NOT DETECTED
INFLUENZA B RESULT: NOT DETECTED
RESP SYN VIRUS RESULT: NOT DETECTED
SARS-COV-2 RESULT: NOT DETECTED

## 2024-03-19 ENCOUNTER — APPOINTMENT (OUTPATIENT)
Dept: INTERNAL MEDICINE | Facility: CLINIC | Age: 64
End: 2024-03-19
Payer: COMMERCIAL

## 2024-03-19 VITALS
HEART RATE: 80 BPM | OXYGEN SATURATION: 98 % | TEMPERATURE: 97.8 F | SYSTOLIC BLOOD PRESSURE: 132 MMHG | BODY MASS INDEX: 32.06 KG/M2 | DIASTOLIC BLOOD PRESSURE: 79 MMHG | WEIGHT: 172 LBS | HEIGHT: 61.61 IN

## 2024-03-19 VITALS
SYSTOLIC BLOOD PRESSURE: 124 MMHG | DIASTOLIC BLOOD PRESSURE: 73 MMHG | HEART RATE: 74 BPM | WEIGHT: 172 LBS | BODY MASS INDEX: 31.86 KG/M2 | RESPIRATION RATE: 12 BRPM

## 2024-03-19 DIAGNOSIS — Z87.09 PERSONAL HISTORY OF OTHER DISEASES OF THE RESPIRATORY SYSTEM: ICD-10-CM

## 2024-03-19 DIAGNOSIS — T50.905A TOXIC LIVER DISEASE WITH HEPATITIS, NOT ELSEWHERE CLASSIFIED: ICD-10-CM

## 2024-03-19 DIAGNOSIS — R05.1 ACUTE COUGH: ICD-10-CM

## 2024-03-19 DIAGNOSIS — K21.9 GASTRO-ESOPHAGEAL REFLUX DISEASE W/OUT ESOPHAGITIS: ICD-10-CM

## 2024-03-19 DIAGNOSIS — K71.6 TOXIC LIVER DISEASE WITH HEPATITIS, NOT ELSEWHERE CLASSIFIED: ICD-10-CM

## 2024-03-19 DIAGNOSIS — E53.8 DEFICIENCY OF OTHER SPECIFIED B GROUP VITAMINS: ICD-10-CM

## 2024-03-19 DIAGNOSIS — I10 ESSENTIAL (PRIMARY) HYPERTENSION: ICD-10-CM

## 2024-03-19 PROCEDURE — 96372 THER/PROPH/DIAG INJ SC/IM: CPT

## 2024-03-19 PROCEDURE — 36415 COLL VENOUS BLD VENIPUNCTURE: CPT

## 2024-03-19 PROCEDURE — 99214 OFFICE O/P EST MOD 30 MIN: CPT | Mod: 25

## 2024-03-19 RX ORDER — CLOBETASOL PROPIONATE 0.5 MG/G
0.05 CREAM TOPICAL
Qty: 60 | Refills: 2 | Status: DISCONTINUED | COMMUNITY
Start: 2017-09-29 | End: 2024-03-19

## 2024-03-19 RX ORDER — HALOBETASOL PROPIONATE 0.5 MG/G
0.05 OINTMENT TOPICAL TWICE DAILY
Qty: 1 | Refills: 2 | Status: DISCONTINUED | COMMUNITY
Start: 2021-02-10 | End: 2024-03-19

## 2024-03-19 NOTE — ASSESSMENT
[FreeTextEntry1] : Diagnosis #1 hypertension, stable.  Same treatment and low-salt diet CMP to lab 2.  Adult onset diabetes.  Diet and weight control advised the patient. A1c to lab.  Also walk half an hour a day 5 days a week 3.  Hyperlipidemia, same treatment and diet.  Lipids to lab 4.  Monitor liver toxicity due to chronic medication use.  CMP to lab 5.  History of gastritis and GE reflux, stable.  CBC to lab 6.  Hypovitaminosis D, being supplemented.  Level to lab 7.  Diabetic retinopathy.  Followed up by the specialist.  Importance to control diabetes emphasized to her 8.  History of hyperreactive airway, stable. 9.  Anxiety and depression, controlled well with current medication.  Continue the same 10.  History of low B12.  1000 mcg given IM in the right arm and level sent to the lab Plan.  Advised to return after 3 months and importance emphasized.  Patient absolutely refused to have colonoscopy and to have FIT

## 2024-03-19 NOTE — HISTORY OF PRESENT ILLNESS
[de-identified] : Patient comes for follow-up of her Chronic medical problems.She feels well without any chest pain, shortness of breath, paroxysmal nocturnal dyspnea  orthopnea or other symptoms

## 2024-03-21 DIAGNOSIS — Z86.39 PERSONAL HISTORY OF OTHER ENDOCRINE, NUTRITIONAL AND METABOLIC DISEASE: ICD-10-CM

## 2024-03-21 DIAGNOSIS — E11.39 TYPE 2 DIABETES MELLITUS WITH OTHER DIABETIC OPHTHALMIC COMPLICATION: ICD-10-CM

## 2024-03-21 DIAGNOSIS — E55.9 VITAMIN D DEFICIENCY, UNSPECIFIED: ICD-10-CM

## 2024-03-21 DIAGNOSIS — E11.8 TYPE 2 DIABETES MELLITUS WITH UNSPECIFIED COMPLICATIONS: ICD-10-CM

## 2024-03-21 LAB
25(OH)D3 SERPL-MCNC: 28.4 NG/ML
ALBUMIN SERPL ELPH-MCNC: 4.4 G/DL
ALP BLD-CCNC: 70 U/L
ALT SERPL-CCNC: 8 U/L
ANION GAP SERPL CALC-SCNC: 10 MMOL/L
AST SERPL-CCNC: 11 U/L
BASOPHILS # BLD AUTO: 0.09 K/UL
BASOPHILS NFR BLD AUTO: 1.4 %
BILIRUB SERPL-MCNC: 0.5 MG/DL
BUN SERPL-MCNC: 20 MG/DL
CALCIUM SERPL-MCNC: 9.6 MG/DL
CHLORIDE SERPL-SCNC: 103 MMOL/L
CHOLEST SERPL-MCNC: 205 MG/DL
CO2 SERPL-SCNC: 26 MMOL/L
CREAT SERPL-MCNC: 0.97 MG/DL
EGFR: 66 ML/MIN/1.73M2
EOSINOPHIL # BLD AUTO: 0.21 K/UL
EOSINOPHIL NFR BLD AUTO: 3.2 %
ESTIMATED AVERAGE GLUCOSE: 180 MG/DL
GLUCOSE SERPL-MCNC: 182 MG/DL
HBA1C MFR BLD HPLC: 7.9 %
HCT VFR BLD CALC: 41.3 %
HDLC SERPL-MCNC: 56 MG/DL
HGB BLD-MCNC: 12.8 G/DL
IMM GRANULOCYTES NFR BLD AUTO: 0.3 %
LDLC SERPL CALC-MCNC: 122 MG/DL
LYMPHOCYTES # BLD AUTO: 2.41 K/UL
LYMPHOCYTES NFR BLD AUTO: 36.4 %
MAN DIFF?: NORMAL
MCHC RBC-ENTMCNC: 26 PG
MCHC RBC-ENTMCNC: 31 GM/DL
MCV RBC AUTO: 83.8 FL
MONOCYTES # BLD AUTO: 0.46 K/UL
MONOCYTES NFR BLD AUTO: 6.9 %
NEUTROPHILS # BLD AUTO: 3.43 K/UL
NEUTROPHILS NFR BLD AUTO: 51.8 %
NONHDLC SERPL-MCNC: 149 MG/DL
PLATELET # BLD AUTO: 297 K/UL
POTASSIUM SERPL-SCNC: 4.6 MMOL/L
PROT SERPL-MCNC: 6.7 G/DL
RBC # BLD: 4.93 M/UL
RBC # FLD: 16 %
SODIUM SERPL-SCNC: 138 MMOL/L
TRIGL SERPL-MCNC: 149 MG/DL
VIT B12 SERPL-MCNC: 319 PG/ML
WBC # FLD AUTO: 6.62 K/UL

## 2024-03-21 RX ORDER — EMPAGLIFLOZIN 10 MG/1
10 TABLET, FILM COATED ORAL
Qty: 90 | Refills: 3 | Status: DISCONTINUED | COMMUNITY
Start: 2022-10-10 | End: 2024-03-21

## 2024-03-21 RX ORDER — ERGOCALCIFEROL 1.25 MG/1
1.25 MG CAPSULE, LIQUID FILLED ORAL
Qty: 12 | Refills: 3 | Status: ACTIVE | COMMUNITY
Start: 2020-06-05 | End: 1900-01-01

## 2024-04-15 ENCOUNTER — RX RENEWAL (OUTPATIENT)
Age: 64
End: 2024-04-15

## 2024-04-15 RX ORDER — AMLODIPINE BESYLATE 5 MG/1
5 TABLET ORAL
Qty: 90 | Refills: 0 | Status: ACTIVE | COMMUNITY
Start: 2019-02-10 | End: 1900-01-01

## 2024-04-16 DIAGNOSIS — E78.2 MIXED HYPERLIPIDEMIA: ICD-10-CM

## 2024-04-16 RX ORDER — ROSUVASTATIN CALCIUM 40 MG/1
40 TABLET, FILM COATED ORAL
Qty: 90 | Refills: 3 | Status: ACTIVE | COMMUNITY
Start: 2024-04-16

## 2024-04-16 RX ORDER — EZETIMIBE 10 MG/1
10 TABLET ORAL
Qty: 90 | Refills: 3 | Status: ACTIVE | COMMUNITY
Start: 2024-04-16 | End: 1900-01-01

## 2024-06-05 ENCOUNTER — APPOINTMENT (OUTPATIENT)
Dept: OBGYN | Facility: CLINIC | Age: 64
End: 2024-06-05
Payer: COMMERCIAL

## 2024-06-05 PROCEDURE — 81002 URINALYSIS NONAUTO W/O SCOPE: CPT

## 2024-06-05 PROCEDURE — 99396 PREV VISIT EST AGE 40-64: CPT

## 2024-06-05 PROCEDURE — 82270 OCCULT BLOOD FECES: CPT

## 2024-07-11 ENCOUNTER — APPOINTMENT (OUTPATIENT)
Dept: INTERNAL MEDICINE | Facility: CLINIC | Age: 64
End: 2024-07-11
Payer: COMMERCIAL

## 2024-07-11 VITALS
BODY MASS INDEX: 32.23 KG/M2 | SYSTOLIC BLOOD PRESSURE: 120 MMHG | RESPIRATION RATE: 14 BRPM | HEART RATE: 76 BPM | DIASTOLIC BLOOD PRESSURE: 77 MMHG | WEIGHT: 174 LBS

## 2024-07-11 DIAGNOSIS — E78.2 MIXED HYPERLIPIDEMIA: ICD-10-CM

## 2024-07-11 DIAGNOSIS — T50.905A TOXIC LIVER DISEASE WITH HEPATITIS, NOT ELSEWHERE CLASSIFIED: ICD-10-CM

## 2024-07-11 DIAGNOSIS — E11.8 TYPE 2 DIABETES MELLITUS WITH UNSPECIFIED COMPLICATIONS: ICD-10-CM

## 2024-07-11 DIAGNOSIS — I10 ESSENTIAL (PRIMARY) HYPERTENSION: ICD-10-CM

## 2024-07-11 DIAGNOSIS — E55.9 VITAMIN D DEFICIENCY, UNSPECIFIED: ICD-10-CM

## 2024-07-11 DIAGNOSIS — K71.6 TOXIC LIVER DISEASE WITH HEPATITIS, NOT ELSEWHERE CLASSIFIED: ICD-10-CM

## 2024-07-11 DIAGNOSIS — K21.9 GASTRO-ESOPHAGEAL REFLUX DISEASE W/OUT ESOPHAGITIS: ICD-10-CM

## 2024-07-11 DIAGNOSIS — Z00.00 ENCOUNTER FOR GENERAL ADULT MEDICAL EXAMINATION W/OUT ABNORMAL FINDINGS: ICD-10-CM

## 2024-07-11 DIAGNOSIS — E53.8 DEFICIENCY OF OTHER SPECIFIED B GROUP VITAMINS: ICD-10-CM

## 2024-07-11 PROCEDURE — 99214 OFFICE O/P EST MOD 30 MIN: CPT | Mod: 25

## 2024-07-11 PROCEDURE — 36415 COLL VENOUS BLD VENIPUNCTURE: CPT

## 2024-07-12 DIAGNOSIS — E11.39 TYPE 2 DIABETES MELLITUS WITH OTHER DIABETIC OPHTHALMIC COMPLICATION: ICD-10-CM

## 2024-07-12 LAB
25(OH)D3 SERPL-MCNC: 36.7 NG/ML
ALBUMIN SERPL ELPH-MCNC: 4.4 G/DL
ALP BLD-CCNC: 70 U/L
ALT SERPL-CCNC: 11 U/L
ANION GAP SERPL CALC-SCNC: 12 MMOL/L
APPEARANCE: CLEAR
AST SERPL-CCNC: 14 U/L
BACTERIA: NEGATIVE /HPF
BASOPHILS # BLD AUTO: 0.11 K/UL
BASOPHILS NFR BLD AUTO: 1.6 %
BILIRUB SERPL-MCNC: 0.4 MG/DL
BILIRUBIN URINE: NEGATIVE
BLOOD URINE: NEGATIVE
BUN SERPL-MCNC: 16 MG/DL
CALCIUM SERPL-MCNC: 9.8 MG/DL
CAST: 0 /LPF
CHLORIDE SERPL-SCNC: 101 MMOL/L
CO2 SERPL-SCNC: 25 MMOL/L
COLOR: YELLOW
CREAT SERPL-MCNC: 0.97 MG/DL
CREAT SPEC-SCNC: 50 MG/DL
EGFR: 66 ML/MIN/1.73M2
EOSINOPHIL # BLD AUTO: 0.17 K/UL
EOSINOPHIL NFR BLD AUTO: 2.5 %
EPITHELIAL CELLS: 0 /HPF
ESTIMATED AVERAGE GLUCOSE: 186 MG/DL
GLUCOSE QUALITATIVE U: >=1000 MG/DL
GLUCOSE SERPL-MCNC: 230 MG/DL
HBA1C MFR BLD HPLC: 8.1 %
HCT VFR BLD CALC: 43 %
HGB BLD-MCNC: 13.1 G/DL
IMM GRANULOCYTES NFR BLD AUTO: 0.6 %
KETONES URINE: NEGATIVE MG/DL
LDLC SERPL CALC-MCNC: 100 MG/DL
LEUKOCYTE ESTERASE URINE: NEGATIVE
LYMPHOCYTES # BLD AUTO: 2.11 K/UL
LYMPHOCYTES NFR BLD AUTO: 31.1 %
MAN DIFF?: NORMAL
MCHC RBC-ENTMCNC: 25.3 PG
MCHC RBC-ENTMCNC: 30.5 GM/DL
MCV RBC AUTO: 83.2 FL
MICROALBUMIN 24H UR DL<=1MG/L-MCNC: <1.2 MG/DL
MICROSCOPIC-UA: NORMAL
MONOCYTES # BLD AUTO: 0.49 K/UL
MONOCYTES NFR BLD AUTO: 7.2 %
NEUTROPHILS # BLD AUTO: 3.87 K/UL
NEUTROPHILS NFR BLD AUTO: 57 %
NITRITE URINE: NEGATIVE
NONHDLC SERPL-MCNC: 122 MG/DL
PH URINE: 6
PLATELET # BLD AUTO: 329 K/UL
POTASSIUM SERPL-SCNC: 5.3 MMOL/L
PROT SERPL-MCNC: 6.7 G/DL
PROTEIN URINE: NEGATIVE MG/DL
RBC # BLD: 5.17 M/UL
RBC # FLD: 15.2 %
RED BLOOD CELLS URINE: 1 /HPF
SODIUM SERPL-SCNC: 138 MMOL/L
SPECIFIC GRAVITY URINE: >1.03
UROBILINOGEN URINE: 0.2 MG/DL
VIT B12 SERPL-MCNC: 370 PG/ML
WBC # FLD AUTO: 6.79 K/UL

## 2024-08-29 ENCOUNTER — RX RENEWAL (OUTPATIENT)
Age: 64
End: 2024-08-29

## 2024-10-22 ENCOUNTER — RX RENEWAL (OUTPATIENT)
Age: 64
End: 2024-10-22

## 2024-11-05 ENCOUNTER — APPOINTMENT (OUTPATIENT)
Dept: INTERNAL MEDICINE | Facility: CLINIC | Age: 64
End: 2024-11-05

## 2024-11-05 VITALS
TEMPERATURE: 98.1 F | HEIGHT: 61.61 IN | HEART RATE: 84 BPM | DIASTOLIC BLOOD PRESSURE: 77 MMHG | OXYGEN SATURATION: 98 % | SYSTOLIC BLOOD PRESSURE: 212 MMHG

## 2024-11-05 VITALS
RESPIRATION RATE: 14 BRPM | BODY MASS INDEX: 31.49 KG/M2 | WEIGHT: 170 LBS | SYSTOLIC BLOOD PRESSURE: 137 MMHG | DIASTOLIC BLOOD PRESSURE: 73 MMHG

## 2024-11-05 DIAGNOSIS — I10 ESSENTIAL (PRIMARY) HYPERTENSION: ICD-10-CM

## 2024-11-05 DIAGNOSIS — E66.9 OBESITY, UNSPECIFIED: ICD-10-CM

## 2024-11-05 DIAGNOSIS — T50.905A TOXIC LIVER DISEASE WITH HEPATITIS, NOT ELSEWHERE CLASSIFIED: ICD-10-CM

## 2024-11-05 DIAGNOSIS — E11.8 TYPE 2 DIABETES MELLITUS WITH UNSPECIFIED COMPLICATIONS: ICD-10-CM

## 2024-11-05 DIAGNOSIS — Z23 ENCOUNTER FOR IMMUNIZATION: ICD-10-CM

## 2024-11-05 DIAGNOSIS — E78.2 MIXED HYPERLIPIDEMIA: ICD-10-CM

## 2024-11-05 DIAGNOSIS — E53.8 DEFICIENCY OF OTHER SPECIFIED B GROUP VITAMINS: ICD-10-CM

## 2024-11-05 DIAGNOSIS — Z00.00 ENCOUNTER FOR GENERAL ADULT MEDICAL EXAMINATION W/OUT ABNORMAL FINDINGS: ICD-10-CM

## 2024-11-05 DIAGNOSIS — K21.9 GASTRO-ESOPHAGEAL REFLUX DISEASE W/OUT ESOPHAGITIS: ICD-10-CM

## 2024-11-05 DIAGNOSIS — E11.39 TYPE 2 DIABETES MELLITUS WITH OTHER DIABETIC OPHTHALMIC COMPLICATION: ICD-10-CM

## 2024-11-05 DIAGNOSIS — E55.9 VITAMIN D DEFICIENCY, UNSPECIFIED: ICD-10-CM

## 2024-11-05 DIAGNOSIS — K71.6 TOXIC LIVER DISEASE WITH HEPATITIS, NOT ELSEWHERE CLASSIFIED: ICD-10-CM

## 2024-11-05 PROCEDURE — 90471 IMMUNIZATION ADMIN: CPT

## 2024-11-05 PROCEDURE — 90673 RIV3 VACCINE NO PRESERV IM: CPT

## 2024-11-05 PROCEDURE — 36415 COLL VENOUS BLD VENIPUNCTURE: CPT

## 2024-11-05 PROCEDURE — 99214 OFFICE O/P EST MOD 30 MIN: CPT | Mod: 25

## 2024-11-05 PROCEDURE — 99401 PREV MED CNSL INDIV APPRX 15: CPT | Mod: 25

## 2024-11-05 RX ADMIN — CYANOCOBALAMIN 0 MCG/ML: 1000 INJECTION INTRAMUSCULAR; SUBCUTANEOUS at 00:00

## 2024-11-06 LAB
25(OH)D3 SERPL-MCNC: 32.2 NG/ML
ALBUMIN SERPL ELPH-MCNC: 4.1 G/DL
ALP BLD-CCNC: 77 U/L
ALT SERPL-CCNC: 9 U/L
ANION GAP SERPL CALC-SCNC: 15 MMOL/L
APPEARANCE: CLEAR
AST SERPL-CCNC: 13 U/L
BACTERIA: NEGATIVE /HPF
BASOPHILS # BLD AUTO: 0.08 K/UL
BASOPHILS NFR BLD AUTO: 1.1 %
BILIRUB SERPL-MCNC: 0.6 MG/DL
BILIRUBIN URINE: NEGATIVE
BLOOD URINE: NEGATIVE
BUN SERPL-MCNC: 16 MG/DL
CALCIUM SERPL-MCNC: 9.6 MG/DL
CAST: 1 /LPF
CHLORIDE SERPL-SCNC: 102 MMOL/L
CHOLEST SERPL-MCNC: 159 MG/DL
CO2 SERPL-SCNC: 24 MMOL/L
COLOR: YELLOW
CREAT SERPL-MCNC: 0.9 MG/DL
CREAT SPEC-SCNC: 54 MG/DL
EGFR: 72 ML/MIN/1.73M2
EOSINOPHIL # BLD AUTO: 0.12 K/UL
EOSINOPHIL NFR BLD AUTO: 1.7 %
EPITHELIAL CELLS: 1 /HPF
ESTIMATED AVERAGE GLUCOSE: 177 MG/DL
GLUCOSE QUALITATIVE U: >=1000 MG/DL
GLUCOSE SERPL-MCNC: 181 MG/DL
HBA1C MFR BLD HPLC: 7.8 %
HCT VFR BLD CALC: 44.3 %
HDLC SERPL-MCNC: 51 MG/DL
HGB BLD-MCNC: 13.2 G/DL
IMM GRANULOCYTES NFR BLD AUTO: 0.3 %
KETONES URINE: NEGATIVE MG/DL
LDLC SERPL CALC-MCNC: 85 MG/DL
LEUKOCYTE ESTERASE URINE: NEGATIVE
LYMPHOCYTES # BLD AUTO: 2.22 K/UL
LYMPHOCYTES NFR BLD AUTO: 30.7 %
MAN DIFF?: NORMAL
MCHC RBC-ENTMCNC: 25.7 PG
MCHC RBC-ENTMCNC: 29.8 G/DL
MCV RBC AUTO: 86.2 FL
MICROALBUMIN 24H UR DL<=1MG/L-MCNC: <1.2 MG/DL
MICROALBUMIN/CREAT 24H UR-RTO: NORMAL MG/G
MICROSCOPIC-UA: NORMAL
MONOCYTES # BLD AUTO: 0.56 K/UL
MONOCYTES NFR BLD AUTO: 7.8 %
NEUTROPHILS # BLD AUTO: 4.22 K/UL
NEUTROPHILS NFR BLD AUTO: 58.4 %
NITRITE URINE: NEGATIVE
NONHDLC SERPL-MCNC: 108 MG/DL
PH URINE: 5.5
PLATELET # BLD AUTO: 332 K/UL
POTASSIUM SERPL-SCNC: 4.8 MMOL/L
PROT SERPL-MCNC: 6.5 G/DL
PROTEIN URINE: NEGATIVE MG/DL
RBC # BLD: 5.14 M/UL
RBC # FLD: 15.3 %
RED BLOOD CELLS URINE: 0 /HPF
SODIUM SERPL-SCNC: 141 MMOL/L
SPECIFIC GRAVITY URINE: >1.03
TRIGL SERPL-MCNC: 136 MG/DL
UROBILINOGEN URINE: 0.2 MG/DL
VIT B12 SERPL-MCNC: >2000 PG/ML
WBC # FLD AUTO: 7.22 K/UL
WHITE BLOOD CELLS URINE: 0 /HPF

## 2024-11-25 ENCOUNTER — RX RENEWAL (OUTPATIENT)
Age: 64
End: 2024-11-25

## 2025-01-14 ENCOUNTER — RX RENEWAL (OUTPATIENT)
Age: 65
End: 2025-01-14

## 2025-01-30 ENCOUNTER — APPOINTMENT (OUTPATIENT)
Dept: INTERNAL MEDICINE | Facility: CLINIC | Age: 65
End: 2025-01-30
Payer: COMMERCIAL

## 2025-01-30 VITALS
WEIGHT: 168 LBS | OXYGEN SATURATION: 96 % | TEMPERATURE: 99 F | HEART RATE: 85 BPM | HEIGHT: 61.61 IN | BODY MASS INDEX: 31.31 KG/M2

## 2025-01-30 VITALS — SYSTOLIC BLOOD PRESSURE: 132 MMHG | DIASTOLIC BLOOD PRESSURE: 77 MMHG | RESPIRATION RATE: 16 BRPM

## 2025-01-30 DIAGNOSIS — J02.9 ACUTE PHARYNGITIS, UNSPECIFIED: ICD-10-CM

## 2025-01-30 DIAGNOSIS — J11.1 INFLUENZA DUE TO UNIDENTIFIED INFLUENZA VIRUS WITH OTHER RESPIRATORY MANIFESTATIONS: ICD-10-CM

## 2025-01-30 PROBLEM — J06.9 VIRAL UPPER RESPIRATORY TRACT INFECTION: Status: ACTIVE | Noted: 2020-02-10

## 2025-01-30 PROCEDURE — 36415 COLL VENOUS BLD VENIPUNCTURE: CPT

## 2025-01-30 PROCEDURE — 99215 OFFICE O/P EST HI 40 MIN: CPT

## 2025-01-31 PROBLEM — J11.1 INFLUENZA: Status: ACTIVE | Noted: 2025-01-31

## 2025-01-31 LAB
25(OH)D3 SERPL-MCNC: 25 NG/ML
ALBUMIN SERPL ELPH-MCNC: 4 G/DL
ALP BLD-CCNC: 74 U/L
ALT SERPL-CCNC: 9 U/L
ANION GAP SERPL CALC-SCNC: 13 MMOL/L
AST SERPL-CCNC: 19 U/L
BASOPHILS # BLD AUTO: 0.07 K/UL
BASOPHILS NFR BLD AUTO: 1.1 %
BILIRUB SERPL-MCNC: 0.3 MG/DL
BUN SERPL-MCNC: 14 MG/DL
CALCIUM SERPL-MCNC: 9.4 MG/DL
CHLORIDE SERPL-SCNC: 100 MMOL/L
CHOLEST SERPL-MCNC: 137 MG/DL
CO2 SERPL-SCNC: 24 MMOL/L
CREAT SERPL-MCNC: 0.93 MG/DL
EGFR: 69 ML/MIN/1.73M2
EOSINOPHIL # BLD AUTO: 0.11 K/UL
EOSINOPHIL NFR BLD AUTO: 1.8 %
ESTIMATED AVERAGE GLUCOSE: 177 MG/DL
GLUCOSE SERPL-MCNC: 209 MG/DL
HBA1C MFR BLD HPLC: 7.8 %
HCT VFR BLD CALC: 42.2 %
HDLC SERPL-MCNC: 34 MG/DL
HGB BLD-MCNC: 13.3 G/DL
IMM GRANULOCYTES NFR BLD AUTO: 0.2 %
INFLUENZA A RESULT: DETECTED
INFLUENZA B RESULT: NOT DETECTED
LDLC SERPL CALC-MCNC: 82 MG/DL
LYMPHOCYTES # BLD AUTO: 1.47 K/UL
LYMPHOCYTES NFR BLD AUTO: 23.9 %
MAN DIFF?: NORMAL
MCHC RBC-ENTMCNC: 25.9 PG
MCHC RBC-ENTMCNC: 31.5 G/DL
MCV RBC AUTO: 82.1 FL
MONOCYTES # BLD AUTO: 1 K/UL
MONOCYTES NFR BLD AUTO: 16.3 %
NEUTROPHILS # BLD AUTO: 3.48 K/UL
NEUTROPHILS NFR BLD AUTO: 56.7 %
NONHDLC SERPL-MCNC: 103 MG/DL
PLATELET # BLD AUTO: 274 K/UL
POTASSIUM SERPL-SCNC: 4.6 MMOL/L
PROT SERPL-MCNC: 6.5 G/DL
RBC # BLD: 5.14 M/UL
RBC # FLD: 15.6 %
RESP SYN VIRUS RESULT: NOT DETECTED
SARS-COV-2 RESULT: NOT DETECTED
SODIUM SERPL-SCNC: 138 MMOL/L
TRIGL SERPL-MCNC: 119 MG/DL
VIT B12 SERPL-MCNC: 311 PG/ML
WBC # FLD AUTO: 6.14 K/UL

## 2025-01-31 RX ORDER — OSELTAMIVIR PHOSPHATE 75 MG/1
75 CAPSULE ORAL TWICE DAILY
Qty: 10 | Refills: 0 | Status: ACTIVE | COMMUNITY
Start: 2025-01-31 | End: 1900-01-01

## 2025-02-03 LAB — BACTERIA THROAT CULT: NORMAL

## 2025-02-05 ENCOUNTER — NON-APPOINTMENT (OUTPATIENT)
Age: 65
End: 2025-02-05

## 2025-02-17 ENCOUNTER — RX RENEWAL (OUTPATIENT)
Age: 65
End: 2025-02-17

## 2025-02-21 ENCOUNTER — RX RENEWAL (OUTPATIENT)
Age: 65
End: 2025-02-21

## 2025-05-21 ENCOUNTER — RX RENEWAL (OUTPATIENT)
Age: 65
End: 2025-05-21

## 2025-06-10 ENCOUNTER — APPOINTMENT (OUTPATIENT)
Dept: OBGYN | Facility: CLINIC | Age: 65
End: 2025-06-10
Payer: COMMERCIAL

## 2025-06-10 PROCEDURE — 99459 PELVIC EXAMINATION: CPT | Mod: NC

## 2025-06-10 PROCEDURE — 99396 PREV VISIT EST AGE 40-64: CPT

## 2025-06-10 PROCEDURE — 81002 URINALYSIS NONAUTO W/O SCOPE: CPT

## 2025-06-11 ENCOUNTER — APPOINTMENT (OUTPATIENT)
Dept: INTERNAL MEDICINE | Facility: CLINIC | Age: 65
End: 2025-06-11
Payer: COMMERCIAL

## 2025-06-11 VITALS
HEART RATE: 76 BPM | DIASTOLIC BLOOD PRESSURE: 76 MMHG | WEIGHT: 169 LBS | SYSTOLIC BLOOD PRESSURE: 134 MMHG | RESPIRATION RATE: 14 BRPM | BODY MASS INDEX: 31.3 KG/M2

## 2025-06-11 DIAGNOSIS — I10 ESSENTIAL (PRIMARY) HYPERTENSION: ICD-10-CM

## 2025-06-11 DIAGNOSIS — E53.8 DEFICIENCY OF OTHER SPECIFIED B GROUP VITAMINS: ICD-10-CM

## 2025-06-11 DIAGNOSIS — E55.9 VITAMIN D DEFICIENCY, UNSPECIFIED: ICD-10-CM

## 2025-06-11 DIAGNOSIS — T50.905A TOXIC LIVER DISEASE WITH HEPATITIS, NOT ELSEWHERE CLASSIFIED: ICD-10-CM

## 2025-06-11 DIAGNOSIS — F41.9 ANXIETY DISORDER, UNSPECIFIED: ICD-10-CM

## 2025-06-11 DIAGNOSIS — K21.9 GASTRO-ESOPHAGEAL REFLUX DISEASE W/OUT ESOPHAGITIS: ICD-10-CM

## 2025-06-11 DIAGNOSIS — E78.2 MIXED HYPERLIPIDEMIA: ICD-10-CM

## 2025-06-11 DIAGNOSIS — K71.6 TOXIC LIVER DISEASE WITH HEPATITIS, NOT ELSEWHERE CLASSIFIED: ICD-10-CM

## 2025-06-11 DIAGNOSIS — J06.9 ACUTE UPPER RESPIRATORY INFECTION, UNSPECIFIED: ICD-10-CM

## 2025-06-11 DIAGNOSIS — E11.8 TYPE 2 DIABETES MELLITUS WITH UNSPECIFIED COMPLICATIONS: ICD-10-CM

## 2025-06-11 PROCEDURE — 99401 PREV MED CNSL INDIV APPRX 15: CPT | Mod: 25

## 2025-06-11 PROCEDURE — 36415 COLL VENOUS BLD VENIPUNCTURE: CPT

## 2025-06-11 PROCEDURE — 99396 PREV VISIT EST AGE 40-64: CPT

## 2025-06-11 PROCEDURE — 99214 OFFICE O/P EST MOD 30 MIN: CPT | Mod: 25

## 2025-06-12 ENCOUNTER — RX RENEWAL (OUTPATIENT)
Age: 65
End: 2025-06-12

## 2025-06-12 LAB
25(OH)D3 SERPL-MCNC: 30.8 NG/ML
ALBUMIN SERPL ELPH-MCNC: 4.1 G/DL
ALP BLD-CCNC: 73 U/L
ALT SERPL-CCNC: 12 U/L
ANION GAP SERPL CALC-SCNC: 18 MMOL/L
APPEARANCE: CLEAR
AST SERPL-CCNC: 17 U/L
BACTERIA: NEGATIVE /HPF
BASOPHILS # BLD AUTO: 0.09 K/UL
BASOPHILS NFR BLD AUTO: 1.3 %
BILIRUB SERPL-MCNC: 0.4 MG/DL
BILIRUBIN URINE: NEGATIVE
BLOOD URINE: NEGATIVE
BUN SERPL-MCNC: 20 MG/DL
CALCIUM SERPL-MCNC: 9.4 MG/DL
CAST: 0 /LPF
CHLORIDE SERPL-SCNC: 102 MMOL/L
CHOLEST SERPL-MCNC: 151 MG/DL
CO2 SERPL-SCNC: 20 MMOL/L
COLOR: YELLOW
CREAT SERPL-MCNC: 0.97 MG/DL
CREAT SPEC-SCNC: 55 MG/DL
EGFRCR SERPLBLD CKD-EPI 2021: 65 ML/MIN/1.73M2
EOSINOPHIL # BLD AUTO: 0.15 K/UL
EOSINOPHIL NFR BLD AUTO: 2.1 %
EPITHELIAL CELLS: 0 /HPF
ESTIMATED AVERAGE GLUCOSE: 200 MG/DL
GLUCOSE QUALITATIVE U: >=1000 MG/DL
GLUCOSE SERPL-MCNC: 203 MG/DL
HBA1C MFR BLD HPLC: 8.6 %
HCT VFR BLD CALC: 42 %
HDLC SERPL-MCNC: 51 MG/DL
HGB BLD-MCNC: 13 G/DL
IMM GRANULOCYTES NFR BLD AUTO: 0.3 %
KETONES URINE: NEGATIVE MG/DL
LDLC SERPL-MCNC: 76 MG/DL
LEUKOCYTE ESTERASE URINE: NEGATIVE
LYMPHOCYTES # BLD AUTO: 2.37 K/UL
LYMPHOCYTES NFR BLD AUTO: 34 %
MAN DIFF?: NORMAL
MCHC RBC-ENTMCNC: 25.6 PG
MCHC RBC-ENTMCNC: 31 G/DL
MCV RBC AUTO: 82.8 FL
MICROALBUMIN 24H UR DL<=1MG/L-MCNC: <1.2 MG/DL
MICROALBUMIN/CREAT 24H UR-RTO: NORMAL MG/G
MICROSCOPIC-UA: NORMAL
MONOCYTES # BLD AUTO: 0.54 K/UL
MONOCYTES NFR BLD AUTO: 7.7 %
NEUTROPHILS # BLD AUTO: 3.81 K/UL
NEUTROPHILS NFR BLD AUTO: 54.6 %
NITRITE URINE: NEGATIVE
NONHDLC SERPL-MCNC: 100 MG/DL
PH URINE: 6
PLATELET # BLD AUTO: 321 K/UL
POTASSIUM SERPL-SCNC: 4.8 MMOL/L
PROT SERPL-MCNC: 6.4 G/DL
PROTEIN URINE: NEGATIVE MG/DL
RBC # BLD: 5.07 M/UL
RBC # FLD: 15.6 %
RED BLOOD CELLS URINE: 0 /HPF
SODIUM SERPL-SCNC: 140 MMOL/L
SPECIFIC GRAVITY URINE: 1.03
TRIGL SERPL-MCNC: 135 MG/DL
UROBILINOGEN URINE: 0.2 MG/DL
VIT B12 SERPL-MCNC: 258 PG/ML
WBC # FLD AUTO: 6.98 K/UL
WHITE BLOOD CELLS URINE: 0 /HPF